# Patient Record
Sex: MALE | Race: BLACK OR AFRICAN AMERICAN | NOT HISPANIC OR LATINO | URBAN - METROPOLITAN AREA
[De-identification: names, ages, dates, MRNs, and addresses within clinical notes are randomized per-mention and may not be internally consistent; named-entity substitution may affect disease eponyms.]

---

## 2022-08-08 ENCOUNTER — INPATIENT (INPATIENT)
Facility: HOSPITAL | Age: 67
LOS: 6 days | Discharge: HOME | DRG: 871 | End: 2022-08-15
Attending: INTERNAL MEDICINE | Admitting: INTERNAL MEDICINE
Payer: SELF-PAY

## 2022-08-08 VITALS
WEIGHT: 179.9 LBS | HEIGHT: 71 IN | HEART RATE: 116 BPM | DIASTOLIC BLOOD PRESSURE: 81 MMHG | SYSTOLIC BLOOD PRESSURE: 135 MMHG | TEMPERATURE: 95 F | OXYGEN SATURATION: 99 % | RESPIRATION RATE: 16 BRPM

## 2022-08-08 DIAGNOSIS — N39.0 URINARY TRACT INFECTION, SITE NOT SPECIFIED: ICD-10-CM

## 2022-08-08 LAB
ALBUMIN SERPL ELPH-MCNC: 2.9 G/DL — LOW (ref 3.3–5)
ALP SERPL-CCNC: 116 U/L — SIGNIFICANT CHANGE UP (ref 40–120)
ALT FLD-CCNC: 13 U/L — SIGNIFICANT CHANGE UP (ref 10–45)
ANION GAP SERPL CALC-SCNC: 13 MMOL/L — SIGNIFICANT CHANGE UP (ref 5–17)
APPEARANCE UR: ABNORMAL
APTT BLD: 18.3 SEC — LOW (ref 27.5–35.5)
AST SERPL-CCNC: 13 U/L — SIGNIFICANT CHANGE UP (ref 10–40)
B-OH-BUTYR SERPL-SCNC: 0.3 MMOL/L — SIGNIFICANT CHANGE UP
BACTERIA # UR AUTO: ABNORMAL
BASE EXCESS BLDV CALC-SCNC: -0.9 MMOL/L — SIGNIFICANT CHANGE UP (ref -2–2)
BASOPHILS # BLD AUTO: 0 K/UL — SIGNIFICANT CHANGE UP (ref 0–0.2)
BASOPHILS NFR BLD AUTO: 0 % — SIGNIFICANT CHANGE UP (ref 0–2)
BILIRUB SERPL-MCNC: 0.9 MG/DL — SIGNIFICANT CHANGE UP (ref 0.2–1.2)
BILIRUB UR-MCNC: NEGATIVE — SIGNIFICANT CHANGE UP
BUN SERPL-MCNC: 43 MG/DL — HIGH (ref 7–23)
CA-I SERPL-SCNC: 1.19 MMOL/L — SIGNIFICANT CHANGE UP (ref 1.15–1.33)
CALCIUM SERPL-MCNC: 8.8 MG/DL — SIGNIFICANT CHANGE UP (ref 8.4–10.5)
CHLORIDE BLDV-SCNC: 94 MMOL/L — LOW (ref 96–108)
CHLORIDE SERPL-SCNC: 91 MMOL/L — LOW (ref 96–108)
CO2 BLDV-SCNC: 25 MMOL/L — SIGNIFICANT CHANGE UP (ref 22–26)
CO2 SERPL-SCNC: 22 MMOL/L — SIGNIFICANT CHANGE UP (ref 22–31)
COLOR SPEC: YELLOW — SIGNIFICANT CHANGE UP
CREAT SERPL-MCNC: 1.58 MG/DL — HIGH (ref 0.5–1.3)
DACRYOCYTES BLD QL SMEAR: SLIGHT — SIGNIFICANT CHANGE UP
DIFF PNL FLD: ABNORMAL
EGFR: 48 ML/MIN/1.73M2 — LOW
ELLIPTOCYTES BLD QL SMEAR: SLIGHT — SIGNIFICANT CHANGE UP
EOSINOPHIL # BLD AUTO: 0 K/UL — SIGNIFICANT CHANGE UP (ref 0–0.5)
EOSINOPHIL NFR BLD AUTO: 0 % — SIGNIFICANT CHANGE UP (ref 0–6)
EPI CELLS # UR: 0 /HPF — SIGNIFICANT CHANGE UP
FLUAV AG NPH QL: SIGNIFICANT CHANGE UP
FLUBV AG NPH QL: SIGNIFICANT CHANGE UP
GAS PNL BLDV: 124 MMOL/L — LOW (ref 136–145)
GAS PNL BLDV: SIGNIFICANT CHANGE UP
GAS PNL BLDV: SIGNIFICANT CHANGE UP
GLUCOSE BLDC GLUCOMTR-MCNC: 295 MG/DL — HIGH (ref 70–99)
GLUCOSE BLDV-MCNC: 368 MG/DL — HIGH (ref 70–99)
GLUCOSE SERPL-MCNC: 358 MG/DL — HIGH (ref 70–99)
GLUCOSE UR QL: ABNORMAL
HCO3 BLDV-SCNC: 24 MMOL/L — SIGNIFICANT CHANGE UP (ref 22–29)
HCT VFR BLD CALC: 31.7 % — LOW (ref 39–50)
HCT VFR BLDA CALC: 31 % — LOW (ref 39–51)
HGB BLD CALC-MCNC: 10.4 G/DL — LOW (ref 12.6–17.4)
HGB BLD-MCNC: 10.4 G/DL — LOW (ref 13–17)
INR BLD: 1.36 RATIO — HIGH (ref 0.88–1.16)
KETONES UR-MCNC: NEGATIVE — SIGNIFICANT CHANGE UP
LACTATE BLDV-MCNC: 1.6 MMOL/L — SIGNIFICANT CHANGE UP (ref 0.7–2)
LEUKOCYTE ESTERASE UR-ACNC: ABNORMAL
LYMPHOCYTES # BLD AUTO: 1.78 K/UL — SIGNIFICANT CHANGE UP (ref 1–3.3)
LYMPHOCYTES # BLD AUTO: 14.2 % — SIGNIFICANT CHANGE UP (ref 13–44)
MAGNESIUM SERPL-MCNC: 2.1 MG/DL — SIGNIFICANT CHANGE UP (ref 1.6–2.6)
MANUAL SMEAR VERIFICATION: SIGNIFICANT CHANGE UP
MCHC RBC-ENTMCNC: 22.6 PG — LOW (ref 27–34)
MCHC RBC-ENTMCNC: 32.8 GM/DL — SIGNIFICANT CHANGE UP (ref 32–36)
MCV RBC AUTO: 68.8 FL — LOW (ref 80–100)
MONOCYTES # BLD AUTO: 1.21 K/UL — HIGH (ref 0–0.9)
MONOCYTES NFR BLD AUTO: 9.7 % — SIGNIFICANT CHANGE UP (ref 2–14)
NEUTROPHILS # BLD AUTO: 9.51 K/UL — HIGH (ref 1.8–7.4)
NEUTROPHILS NFR BLD AUTO: 76.1 % — SIGNIFICANT CHANGE UP (ref 43–77)
NITRITE UR-MCNC: POSITIVE
PCO2 BLDV: 37 MMHG — LOW (ref 42–55)
PH BLDV: 7.41 — SIGNIFICANT CHANGE UP (ref 7.32–7.43)
PH UR: 6 — SIGNIFICANT CHANGE UP (ref 5–8)
PLAT MORPH BLD: NORMAL — SIGNIFICANT CHANGE UP
PLATELET # BLD AUTO: 152 K/UL — SIGNIFICANT CHANGE UP (ref 150–400)
PO2 BLDV: 62 MMHG — HIGH (ref 25–45)
POIKILOCYTOSIS BLD QL AUTO: SLIGHT — SIGNIFICANT CHANGE UP
POTASSIUM BLDV-SCNC: 3.8 MMOL/L — SIGNIFICANT CHANGE UP (ref 3.5–5.1)
POTASSIUM SERPL-MCNC: 3.8 MMOL/L — SIGNIFICANT CHANGE UP (ref 3.5–5.3)
POTASSIUM SERPL-SCNC: 3.8 MMOL/L — SIGNIFICANT CHANGE UP (ref 3.5–5.3)
PROT SERPL-MCNC: 7 G/DL — SIGNIFICANT CHANGE UP (ref 6–8.3)
PROT UR-MCNC: ABNORMAL
PROTHROM AB SERPL-ACNC: 15.7 SEC — HIGH (ref 10.5–13.4)
RBC # BLD: 4.61 M/UL — SIGNIFICANT CHANGE UP (ref 4.2–5.8)
RBC # FLD: 14.8 % — HIGH (ref 10.3–14.5)
RBC BLD AUTO: ABNORMAL
RBC CASTS # UR COMP ASSIST: 37 /HPF — HIGH (ref 0–4)
RSV RNA NPH QL NAA+NON-PROBE: SIGNIFICANT CHANGE UP
SAO2 % BLDV: 91.9 % — HIGH (ref 67–88)
SARS-COV-2 RNA SPEC QL NAA+PROBE: SIGNIFICANT CHANGE UP
SCHISTOCYTES BLD QL AUTO: SLIGHT — SIGNIFICANT CHANGE UP
SODIUM SERPL-SCNC: 126 MMOL/L — LOW (ref 135–145)
SP GR SPEC: 1.02 — SIGNIFICANT CHANGE UP (ref 1.01–1.02)
TOXIC GRANULES BLD QL SMEAR: PRESENT — SIGNIFICANT CHANGE UP
UROBILINOGEN FLD QL: ABNORMAL
WBC # BLD: 12.5 K/UL — HIGH (ref 3.8–10.5)
WBC # FLD AUTO: 12.5 K/UL — HIGH (ref 3.8–10.5)
WBC UR QL: 210 /HPF — HIGH (ref 0–5)

## 2022-08-08 PROCEDURE — 71045 X-RAY EXAM CHEST 1 VIEW: CPT | Mod: 26

## 2022-08-08 PROCEDURE — 99285 EMERGENCY DEPT VISIT HI MDM: CPT

## 2022-08-08 PROCEDURE — 74176 CT ABD & PELVIS W/O CONTRAST: CPT | Mod: 26

## 2022-08-08 PROCEDURE — 93010 ELECTROCARDIOGRAM REPORT: CPT

## 2022-08-08 RX ORDER — DEXTROSE 50 % IN WATER 50 %
15 SYRINGE (ML) INTRAVENOUS ONCE
Refills: 0 | Status: DISCONTINUED | OUTPATIENT
Start: 2022-08-08 | End: 2022-08-15

## 2022-08-08 RX ORDER — CEFTRIAXONE 500 MG/1
1000 INJECTION, POWDER, FOR SOLUTION INTRAMUSCULAR; INTRAVENOUS ONCE
Refills: 0 | Status: COMPLETED | OUTPATIENT
Start: 2022-08-08 | End: 2022-08-08

## 2022-08-08 RX ORDER — SODIUM CHLORIDE 9 MG/ML
1000 INJECTION, SOLUTION INTRAVENOUS
Refills: 0 | Status: COMPLETED | OUTPATIENT
Start: 2022-08-08 | End: 2022-08-08

## 2022-08-08 RX ORDER — ACETAMINOPHEN 500 MG
975 TABLET ORAL ONCE
Refills: 0 | Status: COMPLETED | OUTPATIENT
Start: 2022-08-08 | End: 2022-08-08

## 2022-08-08 RX ORDER — SODIUM CHLORIDE 9 MG/ML
1000 INJECTION, SOLUTION INTRAVENOUS
Refills: 0 | Status: DISCONTINUED | OUTPATIENT
Start: 2022-08-08 | End: 2022-08-15

## 2022-08-08 RX ORDER — INSULIN LISPRO 100/ML
VIAL (ML) SUBCUTANEOUS AT BEDTIME
Refills: 0 | Status: DISCONTINUED | OUTPATIENT
Start: 2022-08-08 | End: 2022-08-15

## 2022-08-08 RX ORDER — INSULIN GLARGINE 100 [IU]/ML
5 INJECTION, SOLUTION SUBCUTANEOUS AT BEDTIME
Refills: 0 | Status: DISCONTINUED | OUTPATIENT
Start: 2022-08-08 | End: 2022-08-09

## 2022-08-08 RX ORDER — GLUCAGON INJECTION, SOLUTION 0.5 MG/.1ML
1 INJECTION, SOLUTION SUBCUTANEOUS ONCE
Refills: 0 | Status: DISCONTINUED | OUTPATIENT
Start: 2022-08-08 | End: 2022-08-15

## 2022-08-08 RX ORDER — HEPARIN SODIUM 5000 [USP'U]/ML
5000 INJECTION INTRAVENOUS; SUBCUTANEOUS EVERY 8 HOURS
Refills: 0 | Status: DISCONTINUED | OUTPATIENT
Start: 2022-08-08 | End: 2022-08-15

## 2022-08-08 RX ORDER — SODIUM CHLORIDE 9 MG/ML
1000 INJECTION, SOLUTION INTRAVENOUS ONCE
Refills: 0 | Status: COMPLETED | OUTPATIENT
Start: 2022-08-08 | End: 2022-08-08

## 2022-08-08 RX ORDER — INSULIN LISPRO 100/ML
VIAL (ML) SUBCUTANEOUS
Refills: 0 | Status: DISCONTINUED | OUTPATIENT
Start: 2022-08-08 | End: 2022-08-15

## 2022-08-08 RX ORDER — PIPERACILLIN AND TAZOBACTAM 4; .5 G/20ML; G/20ML
3.38 INJECTION, POWDER, LYOPHILIZED, FOR SOLUTION INTRAVENOUS EVERY 8 HOURS
Refills: 0 | Status: DISCONTINUED | OUTPATIENT
Start: 2022-08-09 | End: 2022-08-09

## 2022-08-08 RX ORDER — PIPERACILLIN AND TAZOBACTAM 4; .5 G/20ML; G/20ML
3.38 INJECTION, POWDER, LYOPHILIZED, FOR SOLUTION INTRAVENOUS ONCE
Refills: 0 | Status: COMPLETED | OUTPATIENT
Start: 2022-08-08 | End: 2022-08-08

## 2022-08-08 RX ORDER — DEXTROSE 50 % IN WATER 50 %
25 SYRINGE (ML) INTRAVENOUS ONCE
Refills: 0 | Status: DISCONTINUED | OUTPATIENT
Start: 2022-08-08 | End: 2022-08-15

## 2022-08-08 RX ORDER — DEXTROSE 50 % IN WATER 50 %
12.5 SYRINGE (ML) INTRAVENOUS ONCE
Refills: 0 | Status: DISCONTINUED | OUTPATIENT
Start: 2022-08-08 | End: 2022-08-15

## 2022-08-08 RX ORDER — ONDANSETRON 8 MG/1
4 TABLET, FILM COATED ORAL ONCE
Refills: 0 | Status: COMPLETED | OUTPATIENT
Start: 2022-08-08 | End: 2022-08-08

## 2022-08-08 RX ADMIN — SODIUM CHLORIDE 1000 MILLILITER(S): 9 INJECTION, SOLUTION INTRAVENOUS at 15:47

## 2022-08-08 RX ADMIN — Medication 975 MILLIGRAM(S): at 17:06

## 2022-08-08 RX ADMIN — Medication 975 MILLIGRAM(S): at 16:15

## 2022-08-08 RX ADMIN — CEFTRIAXONE 1000 MILLIGRAM(S): 500 INJECTION, POWDER, FOR SOLUTION INTRAMUSCULAR; INTRAVENOUS at 18:11

## 2022-08-08 RX ADMIN — ONDANSETRON 4 MILLIGRAM(S): 8 TABLET, FILM COATED ORAL at 18:11

## 2022-08-08 RX ADMIN — INSULIN GLARGINE 5 UNIT(S): 100 INJECTION, SOLUTION SUBCUTANEOUS at 22:34

## 2022-08-08 RX ADMIN — Medication 1: at 22:46

## 2022-08-08 RX ADMIN — SODIUM CHLORIDE 1000 MILLILITER(S): 9 INJECTION, SOLUTION INTRAVENOUS at 16:15

## 2022-08-08 RX ADMIN — HEPARIN SODIUM 5000 UNIT(S): 5000 INJECTION INTRAVENOUS; SUBCUTANEOUS at 22:19

## 2022-08-08 RX ADMIN — SODIUM CHLORIDE 75 MILLILITER(S): 9 INJECTION, SOLUTION INTRAVENOUS at 22:47

## 2022-08-08 RX ADMIN — CEFTRIAXONE 100 MILLIGRAM(S): 500 INJECTION, POWDER, FOR SOLUTION INTRAMUSCULAR; INTRAVENOUS at 17:11

## 2022-08-08 RX ADMIN — PIPERACILLIN AND TAZOBACTAM 200 GRAM(S): 4; .5 INJECTION, POWDER, LYOPHILIZED, FOR SOLUTION INTRAVENOUS at 22:19

## 2022-08-08 NOTE — H&P ADULT - ASSESSMENT
76F PMH HTN, DM, hypothyroidism presenting with 3 days of worsening SOB and dry cough associated with sternal nonradiating, nonpleuritic chest pressure. Denies n/v/d/c, abdominal pain. Reports fever 100F on Saturday. Reports chills. Denies headache, lightheadedness. Denies calf pain/swelling, hx of blood clots, recent travel/immobilization/surgery/malignancy, hormone use.     68 yo M with no known pmhx, does not regualrly see doctors, presents to the ED for generalized weakness/fatigue, decreased PO intake, polydipsia.    # Sepsis   # UTI  Hypothermic/ Hyperthermic   Leukocytosis   Positive UA   Blood cx Urine cx   COVID neg   Zosyn empiric coverage   ID consult in AM       # Hyperglycemia A1C   Start Lantus HISS     # PAO dehydration   iv Fluids     # HTN Start patient on low dose Norvasc

## 2022-08-08 NOTE — ED PROVIDER NOTE - CARE PLAN
1 Principal Discharge DX:	Acute UTI   Principal Discharge DX:	Sepsis secondary to UTI  Secondary Diagnosis:	Hyperglycemia

## 2022-08-08 NOTE — ED PROVIDER NOTE - CLINICAL SUMMARY MEDICAL DECISION MAKING FREE TEXT BOX
67M no PMH, does not see doctors presenting with a few days of fatigue, generalized weakness, hyperglycemia. FSG by EMS 400s, rpt here in 300s. Found to by hypothermic 94F in triage. Given hx and physical, ddx includes but is not limited to sepsis secondary to uti vs pneumonia, DKA, hyperthyroid, electrolyte abn. Plan for labs, IVF, ecg, cxr, UA, likely TBA 67M no PMH, does not see doctors presenting with a few days of fatigue, generalized weakness, hyperglycemia. FSG by EMS 400s, rpt here in 300s. Found to by hypothermic 94F in triage. Given hx and physical, ddx includes but is not limited to sepsis secondary to uti vs pneumonia, DKA, hyperthyroid, electrolyte abn. non tender abd, doubt acute abd etiology for pt's sx. no cva tenderesns. Plan for labs, IVF, ecg, cxr, UA, likely TBA

## 2022-08-08 NOTE — ED ADULT NURSE NOTE - IS THE PATIENT ABLE TO BE SCREENED?
[FreeTextEntry1] : Reduce Symbicort HFA to 1 puff BID\par Return for pulmonary follow-up in 6 weeks. Yes

## 2022-08-08 NOTE — H&P ADULT - HISTORY OF PRESENT ILLNESS
66 yo M with no known pmhx, does not regualrly see doctors, presents to the ED for generalized weakness/fatigue, decreased PO intake, polydipsia.     Patient poor historian does not follow up with doctors, reports increased urination, wekaness, decreased po intake.  Patient endorses of having subjective fevers on and off x 3 days.    No nausea/ vomiting.   Pt FS in the field was 400s, given some IVF (currently running upon ED arrival), with ED FS in 300s here.   Pt found to be hypothermic to 9

## 2022-08-08 NOTE — ED PROVIDER NOTE - PROGRESS NOTE DETAILS
Estrellita Castillo DO (PGY2): Pt with positive UTI. Ceftriaxone ordered. No signs of DKA on labs. Plan TBA for sepsis secondary to UTI and new onset diabetes. Spoke with Dr. Craig, will admit to her service. Estrellita Castillo DO (PGY2): Rectal exam: chaperoned by Brett ED tech. No hemmorrhoids or fissures noted on external exam. No masses palpated. Brown stool present. Prostate nontender.

## 2022-08-08 NOTE — ED ADULT NURSE NOTE - OBJECTIVE STATEMENT
67 yr old male who has not gone to a doctor in a long time was found wandering around his work parking lot saying he does not feel well. he was found to have high sugar of 400. on assessment a and o x 3 lungs clear abd soft non tender no swelling in extremities. some nausea and vomiting on arrival. pt had also urinated on himself said he couldn't make it to toilet. complains of abd pain

## 2022-08-08 NOTE — ED PROVIDER NOTE - OBJECTIVE STATEMENT
67M no PMH, does not see doctors presenting with 67M no PMH, does not see doctors presenting with a few days of fatigue, generalized weakness, hyperglycemia. FSG by EMS 400s, rpt here in 300s. Found to by hypothermic 94F in triage. Denies chest pain, sob, cough. Denies dysuria, hematuria, abdominal pain, n/v/d/c, headache. 68 yo M with no known pmhx, does not regualrly see doctors, presents to the ED for generalized weakness/fatigue, decreased PO intake, polydipsia. Pt FS in the field was 400s, given some IVF (currently running upon ED arrival), with ED FS in 300s here. Pt found to be hypothermic to 94F. He denies reported fevres, dysuria, abd pain, n/v/d, cp, sob,. Pt does have mild cough.

## 2022-08-08 NOTE — PATIENT PROFILE ADULT - FALL HARM RISK - HARM RISK INTERVENTIONS

## 2022-08-08 NOTE — ED PROVIDER NOTE - ATTENDING CONTRIBUTION TO CARE
I, Vijay Alvarado, performed a history and physical exam of the patient and discussed their management with the resident and/or advanced care provider. I reviewed the resident and/or advanced care provider's note and agree with the documented findings and plan of care except where noted. I was present and available for all procedures.     agree with above.

## 2022-08-08 NOTE — ED PROVIDER NOTE - PHYSICAL EXAMINATION
GENERAL: Awake. Alert. NAD. Well nourished.  HEENT: NC/AT, Conjunctiva pink, no scleral icterus. Airway patent. Moist mucous membranes.  LUNGS: CTAB. No wheezes or rales noted.  CARDIAC: Chest non-tender to palpation. RRR.  ABDOMEN: No masses noted. Soft, NT, ND, no rebound, no guarding.  EXT: No edema, no calf tenderness, distal pulses 2+ bilaterally  NEURO: A&Ox3. Moving all extremities. Sensation and strength intact throughout. No focal deficits.   SKIN: Warm and dry.   PSYCH: Normal affect. PHYSICAL EXAM:  GENERAL: non-toxic appearing; in no respiratory distress;  HEAD Atraumatic, Normocephalic  ENT; dry mucous membranes;   NECK: No JVD; trachea midline  EYES: PERRL, EOMs intact b/l w/out deficits; normal conjunctiva  CHEST/LUNG: CTAB no wheezes/rhonchi/rales  HEART: RRR no murmur/gallops/rubs  ABDOMEN: soft, NT, ND  EXTREMITIES: No LE edema, +2 radial pulses b/l, +2 DP/PT pulses b/l  MUSCULOSKELETAL: FROM of all 4 extremities;  NERVOUS SYSTEM:  A&Ox3, No motor deficits or sensory deficits; CNII-XII intact; Speech is fluent and appropriate  SKIN:  Warm and dry as visualized

## 2022-08-09 DIAGNOSIS — E78.5 HYPERLIPIDEMIA, UNSPECIFIED: ICD-10-CM

## 2022-08-09 DIAGNOSIS — E11.65 TYPE 2 DIABETES MELLITUS WITH HYPERGLYCEMIA: ICD-10-CM

## 2022-08-09 DIAGNOSIS — I10 ESSENTIAL (PRIMARY) HYPERTENSION: ICD-10-CM

## 2022-08-09 LAB
-  COAGULASE NEGATIVE STAPHYLOCOCCUS: SIGNIFICANT CHANGE UP
A1C WITH ESTIMATED AVERAGE GLUCOSE RESULT: 11.4 % — HIGH (ref 4–5.6)
ANION GAP SERPL CALC-SCNC: 10 MMOL/L — SIGNIFICANT CHANGE UP (ref 5–17)
BUN SERPL-MCNC: 34 MG/DL — HIGH (ref 7–23)
CALCIUM SERPL-MCNC: 8.8 MG/DL — SIGNIFICANT CHANGE UP (ref 8.4–10.5)
CHLORIDE SERPL-SCNC: 93 MMOL/L — LOW (ref 96–108)
CO2 SERPL-SCNC: 26 MMOL/L — SIGNIFICANT CHANGE UP (ref 22–31)
CREAT SERPL-MCNC: 1.48 MG/DL — HIGH (ref 0.5–1.3)
E COLI DNA BLD POS QL NAA+NON-PROBE: SIGNIFICANT CHANGE UP
EGFR: 52 ML/MIN/1.73M2 — LOW
ESTIMATED AVERAGE GLUCOSE: 280 MG/DL — HIGH (ref 68–114)
GLUCOSE BLDC GLUCOMTR-MCNC: 138 MG/DL — HIGH (ref 70–99)
GLUCOSE BLDC GLUCOMTR-MCNC: 212 MG/DL — HIGH (ref 70–99)
GLUCOSE BLDC GLUCOMTR-MCNC: 265 MG/DL — HIGH (ref 70–99)
GLUCOSE BLDC GLUCOMTR-MCNC: 277 MG/DL — HIGH (ref 70–99)
GLUCOSE BLDC GLUCOMTR-MCNC: 283 MG/DL — HIGH (ref 70–99)
GLUCOSE SERPL-MCNC: 287 MG/DL — HIGH (ref 70–99)
GRAM STN FLD: SIGNIFICANT CHANGE UP
HCT VFR BLD CALC: 30.7 % — LOW (ref 39–50)
HCV AB S/CO SERPL IA: 0.19 S/CO — SIGNIFICANT CHANGE UP (ref 0–0.99)
HCV AB SERPL-IMP: SIGNIFICANT CHANGE UP
HGB BLD-MCNC: 10.1 G/DL — LOW (ref 13–17)
MCHC RBC-ENTMCNC: 22.7 PG — LOW (ref 27–34)
MCHC RBC-ENTMCNC: 32.9 GM/DL — SIGNIFICANT CHANGE UP (ref 32–36)
MCV RBC AUTO: 69.1 FL — LOW (ref 80–100)
METHOD TYPE: SIGNIFICANT CHANGE UP
NRBC # BLD: 0 /100 WBCS — SIGNIFICANT CHANGE UP (ref 0–0)
OSMOLALITY SERPL: 287 MOSMOL/KG — SIGNIFICANT CHANGE UP (ref 280–301)
PLATELET # BLD AUTO: 164 K/UL — SIGNIFICANT CHANGE UP (ref 150–400)
POTASSIUM SERPL-MCNC: 4 MMOL/L — SIGNIFICANT CHANGE UP (ref 3.5–5.3)
POTASSIUM SERPL-SCNC: 4 MMOL/L — SIGNIFICANT CHANGE UP (ref 3.5–5.3)
RBC # BLD: 4.44 M/UL — SIGNIFICANT CHANGE UP (ref 4.2–5.8)
RBC # FLD: 14.6 % — HIGH (ref 10.3–14.5)
SODIUM SERPL-SCNC: 129 MMOL/L — LOW (ref 135–145)
SPECIMEN SOURCE: SIGNIFICANT CHANGE UP
SPECIMEN SOURCE: SIGNIFICANT CHANGE UP
WBC # BLD: 9.33 K/UL — SIGNIFICANT CHANGE UP (ref 3.8–10.5)
WBC # FLD AUTO: 9.33 K/UL — SIGNIFICANT CHANGE UP (ref 3.8–10.5)

## 2022-08-09 PROCEDURE — 99254 IP/OBS CNSLTJ NEW/EST MOD 60: CPT

## 2022-08-09 PROCEDURE — 99255 IP/OBS CONSLTJ NEW/EST HI 80: CPT

## 2022-08-09 RX ORDER — ACETAMINOPHEN 500 MG
1000 TABLET ORAL ONCE
Refills: 0 | Status: COMPLETED | OUTPATIENT
Start: 2022-08-09 | End: 2022-08-09

## 2022-08-09 RX ORDER — ACETAMINOPHEN 500 MG
975 TABLET ORAL ONCE
Refills: 0 | Status: COMPLETED | OUTPATIENT
Start: 2022-08-09 | End: 2022-08-09

## 2022-08-09 RX ORDER — INSULIN LISPRO 100/ML
5 VIAL (ML) SUBCUTANEOUS
Refills: 0 | Status: DISCONTINUED | OUTPATIENT
Start: 2022-08-09 | End: 2022-08-14

## 2022-08-09 RX ORDER — INSULIN GLARGINE 100 [IU]/ML
15 INJECTION, SOLUTION SUBCUTANEOUS AT BEDTIME
Refills: 0 | Status: DISCONTINUED | OUTPATIENT
Start: 2022-08-09 | End: 2022-08-14

## 2022-08-09 RX ORDER — CHLORHEXIDINE GLUCONATE 213 G/1000ML
1 SOLUTION TOPICAL DAILY
Refills: 0 | Status: DISCONTINUED | OUTPATIENT
Start: 2022-08-09 | End: 2022-08-15

## 2022-08-09 RX ORDER — CEFTRIAXONE 500 MG/1
1000 INJECTION, POWDER, FOR SOLUTION INTRAMUSCULAR; INTRAVENOUS EVERY 24 HOURS
Refills: 0 | Status: DISCONTINUED | OUTPATIENT
Start: 2022-08-09 | End: 2022-08-15

## 2022-08-09 RX ADMIN — INSULIN GLARGINE 15 UNIT(S): 100 INJECTION, SOLUTION SUBCUTANEOUS at 21:57

## 2022-08-09 RX ADMIN — PIPERACILLIN AND TAZOBACTAM 25 GRAM(S): 4; .5 INJECTION, POWDER, LYOPHILIZED, FOR SOLUTION INTRAVENOUS at 09:08

## 2022-08-09 RX ADMIN — PIPERACILLIN AND TAZOBACTAM 25 GRAM(S): 4; .5 INJECTION, POWDER, LYOPHILIZED, FOR SOLUTION INTRAVENOUS at 02:56

## 2022-08-09 RX ADMIN — Medication 975 MILLIGRAM(S): at 08:31

## 2022-08-09 RX ADMIN — Medication 975 MILLIGRAM(S): at 08:58

## 2022-08-09 RX ADMIN — HEPARIN SODIUM 5000 UNIT(S): 5000 INJECTION INTRAVENOUS; SUBCUTANEOUS at 21:24

## 2022-08-09 RX ADMIN — Medication 1000 MILLIGRAM(S): at 19:04

## 2022-08-09 RX ADMIN — Medication 400 MILLIGRAM(S): at 18:18

## 2022-08-09 RX ADMIN — Medication 3: at 12:14

## 2022-08-09 RX ADMIN — Medication 400 MILLIGRAM(S): at 00:37

## 2022-08-09 RX ADMIN — HEPARIN SODIUM 5000 UNIT(S): 5000 INJECTION INTRAVENOUS; SUBCUTANEOUS at 06:03

## 2022-08-09 RX ADMIN — Medication 5 UNIT(S): at 17:45

## 2022-08-09 RX ADMIN — Medication 1000 MILLIGRAM(S): at 01:37

## 2022-08-09 RX ADMIN — Medication 2: at 17:45

## 2022-08-09 RX ADMIN — HEPARIN SODIUM 5000 UNIT(S): 5000 INJECTION INTRAVENOUS; SUBCUTANEOUS at 17:45

## 2022-08-09 RX ADMIN — Medication 3: at 09:08

## 2022-08-09 RX ADMIN — CHLORHEXIDINE GLUCONATE 1 APPLICATION(S): 213 SOLUTION TOPICAL at 11:41

## 2022-08-09 RX ADMIN — CEFTRIAXONE 100 MILLIGRAM(S): 500 INJECTION, POWDER, FOR SOLUTION INTRAMUSCULAR; INTRAVENOUS at 17:46

## 2022-08-09 NOTE — DIETITIAN INITIAL EVALUATION ADULT - SIGNS/SYMPTOMS
Hyperglycemia, FS >250mg/dL, new DM dx, pt receiving insulin  hyperglycemia, weight loss 10% in 1 year

## 2022-08-09 NOTE — DIETITIAN INITIAL EVALUATION ADULT - REASON INDICATOR FOR ASSESSMENT
New DM and Poor PO Intake. "History of Present Illness:   66 yo M with no known pmhx, does not regualrly see doctors, presents to the ED for generalized weakness/fatigue, decreased PO intake, polydipsia. "     New DM and Poor PO Intake. "History of Present Illness:   66 yo M with no known pmhx, does not regularly see doctors, presents to the ED for generalized weakness/fatigue, decreased PO intake, polydipsia. "

## 2022-08-09 NOTE — DIETITIAN INITIAL EVALUATION ADULT - OTHER INFO
Pt denies chewing difficulty, he does not take any vitamins at home.  patient appears  physically fit, he has high actitivity level in his job as stated

## 2022-08-09 NOTE — PROGRESS NOTE ADULT - ASSESSMENT
66 yo M with no known pmhx, does not regualrly see doctors, presents to the ED for generalized weakness/fatigue, decreased PO intake, polydipsia.    # Sepsis   # UTI  Hypothermic/ Hyperthermic   Leukocytosis   Positive UA   Blood cx Urine cx   COVID neg   Zosyn empiric coverage   ID consult appreciated         # Hyperglycemia A1C   Start Lantus HISS     # PAO dehydration   iv Fluids     # HTN Start patient on low dose Norvasc

## 2022-08-09 NOTE — CONSULT NOTE ADULT - ATTENDING COMMENTS
Patient with new diagnosis of DM with E coli bacteremia secondary to Pyelonephritis  BCID PCR without detected ESBL targets  Would deescalate Zosyn to Ceftriaxone  Coagulase negative staph on BCx likely contaminant  Would repeat blood cultures    I will continue to follow. Please feel free to contact me with any further questions.    Chicho Madrid M.D.  [facility] Division of Infectious Disease  8AM-5PM Monday - Friday: Available on [employer] Teams  After Hours and Holidays (or if no response on [employer] Teams): Please contact the Infectious Diseases Office at [phone]

## 2022-08-09 NOTE — CONSULT NOTE ADULT - SUBJECTIVE AND OBJECTIVE BOX
Patient is a 67y old  Male who presents with a chief complaint of Urinary tract infection     (09 Aug 2022 10:51)    HPI:  68 yo M with no known pmhx, does not regualrly see doctors, presents to the ED for generalized weakness/fatigue, decreased PO intake, polydipsia.     Patient poor historian does not follow up with doctors, reports increased urination, wekaness, decreased po intake.  Patient endorses of having subjective fevers on and off x 3 days.    No nausea/ vomiting.   Pt FS in the field was 400s, given some IVF (currently running upon ED arrival), with ED FS in 300s here.   Pt found to be hypothermic to 9 (08 Aug 2022 19:04)       prior hospital charts reviewed [  ]  primary team notes reviewed [  ]  other consultant notes reviewed [  ]    PAST MEDICAL & SURGICAL HISTORY:  No pertinent past medical history          Allergies  No Known Allergies    ANTIMICROBIALS (past 90 days)  MEDICATIONS  (STANDING):  cefTRIAXone   IVPB   100 mL/Hr IV Intermittent (22 @ 17:11)    piperacillin/tazobactam IVPB.   200 mL/Hr IV Intermittent (22 @ 22:19)    piperacillin/tazobactam IVPB..   25 mL/Hr IV Intermittent (22 @ 09:08)   25 mL/Hr IV Intermittent (22 @ 02:56)        piperacillin/tazobactam IVPB.. 3.375 every 8 hours    MEDICATIONS  (STANDING):  dextrose 50% Injectable 25 once  dextrose 50% Injectable 12.5 once  dextrose 50% Injectable 25 once  dextrose Oral Gel 15 once PRN  glucagon  Injectable 1 once  heparin   Injectable 5000 every 8 hours  insulin glargine Injectable (LANTUS) 5 at bedtime  insulin lispro (ADMELOG) corrective regimen sliding scale  three times a day before meals  insulin lispro (ADMELOG) corrective regimen sliding scale  at bedtime    SOCIAL HISTORY:       FAMILY HISTORY:    REVIEW OF SYSTEMS  [  ] ROS unobtainable because:    [  ] All other systems negative except as noted below:	    Constitutional:  [ ] fever [ ] chills  [ ] weight loss  [ ] weakness  Skin:  [ ] rash [ ] phlebitis	  Eyes: [ ] icterus [ ] pain  [ ] discharge	  ENMT: [ ] sore throat  [ ] thrush [ ] ulcers [ ] exudates  Respiratory: [ ] dyspnea [ ] hemoptysis [ ] cough [ ] sputum	  Cardiovascular:  [ ] chest pain [ ] palpitations [ ] edema	  Gastrointestinal:  [ ] nausea [ ] vomiting [ ] diarrhea [ ] constipation [ ] pain	  Genitourinary:  [ ] dysuria [ ] frequency [ ] hematuria [ ] discharge [ ] flank pain  [ ] incontinence  Musculoskeletal:  [ ] myalgias [ ] arthralgias [ ] arthritis  [ ] back pain  Neurological:  [ ] headache [ ] seizures  [ ] confusion/altered mental status  Psychiatric:  [ ] anxiety [ ] depression	  Hematology/Lymphatics:  [ ] lymphadenopathy  Endocrine:  [ ] adrenal [ ] thyroid  Allergic/Immunologic:	 [ ] transplant [ ] seasonal    Vital Signs Last 24 Hrs  T(F): 98.7 (22 @ 10:30), Max: 102.4 (22 @ 23:49)  Vital Signs Last 24 Hrs  HR: 89 (22 @ 10:30) (78 - 116)  BP: 136/68 (22 @ 10:30) (135/81 - 175/75)  RR: 18 (22 @ 10:30)  SpO2: 99% (22 @ 10:30) (97% - 99%)  Wt(kg): --    PHYSICAL EXAM:  Constitutional: non-toxic, no distress  HEAD/EYES: anicteric, no conjunctival injection  ENT:  supple, no thrush  Cardiovascular:   normal S1, S2, no murmur, no edema  Respiratory:  clear BS bilaterally, no wheezes, no rales  GI:  soft, non-tender, normal bowel sounds  :  no chaudhary, no CVA tenderness  Musculoskeletal:  no synovitis, normal ROM  Neurologic: awake and alert, normal strength, no focal findings  Skin:  no rash, no erythema, no phlebitis  Heme/Onc: no lymphadenopathy   Psychiatric:  awake, alert, appropriate mood                            10.4   12.50 )-----------( 152      ( 08 Aug 2022 15:20 )             31.7       129<L>  |  93<L>  |  34<H>  ----------------------------<  287<H>  4.0   |  26  |  1.48<H>    Ca    8.8      09 Aug 2022 06:20  Mg     2.1         TPro  7.0  /  Alb  2.9<L>  /  TBili  0.9  /  DBili  x   /  AST  13  /  ALT  13  /  AlkPhos  116      Urinalysis Basic - ( 08 Aug 2022 16:19 )    Color: Yellow / Appearance: Slightly Turbid / S.017 / pH: x  Gluc: x / Ketone: Negative  / Bili: Negative / Urobili: 2 mg/dL   Blood: x / Protein: 100 mg/dL / Nitrite: Positive   Leuk Esterase: Large / RBC: 37 /hpf /  /HPF   Sq Epi: x / Non Sq Epi: 0 /hpf / Bacteria: Many    MICROBIOLOGY:  Culture - Blood (collected 08 Aug 2022 14:45)  Source: .Blood Blood-Peripheral  Gram Stain (09 Aug 2022 06:34):    Growth in aerobic and anaerobic bottles: Gram Negative Rods  Preliminary Report (09 Aug 2022 06:35):    Growth in aerobic and anaerobic bottles: Gram Negative Rods    Culture - Blood (collected 08 Aug 2022 14:40)  Source: .Blood Blood-Peripheral  Gram Stain (09 Aug 2022 06:41):    Growth in aerobic bottle: Gram Negative Rods and Gram positive cocci in    pairs  Preliminary Report (09 Aug 2022 06:56):    Growth in aerobic bottle: Gram Negative Rods and Gram positive cocci in    pairs    ***Blood Panel PCR results on this specimen are available    approximately 3 hours after the Gram stain result.***    Gram stain, PCR, and/or culture results may not always    correspond due to difference in methodologies.    ************************************************************    This PCR assay was performed by multiplex PCR. This    Assay tests for 66 bacterial and resistance gene targets.    Please refer to the [facility] test directory    at [email or web address] for details.  Organism: Blood Culture PCR (09 Aug 2022 09:35)  Organism: Blood Culture PCR (09 Aug 2022 09:35)      -  Coagulase negative Staphylococcus: Detec      -  Escherichia coli: Detec      Method Type: PCR                RADIOLOGY:  < from: CT Abdomen and Pelvis No Cont (22 @ 19:12) >   No obstructing renal stones or hydronephrosis.  Mildly edematous left kidney with b/l non-specific fat stranding.  Mild symmetric bladder wall thickening.  Finding likely represent ascending urinary tract infection. Recommend   clinical correlation w/U/A.  Nonspecific left-sided predominant subcentimer in short-axis   retroperitoneal LNs  f/u official AM report.    < end of copied text >    < from: Xray Chest 1 View- PORTABLE-Urgent (22 @ 15:06) >  Clear lungs.    < end of copied text >     Patient is a 67y old  Male who presents with a chief complaint of Urinary tract infection     (09 Aug 2022 10:51)    HPI:  Mr. Smith is a 68 yo gentleman with no known PMH, does not regularly see doctors, presents to the ED for generalized weakness/fatigue, decreased PO intake, and polydipsia. Since the last 4 days, he has felt like he is urinating more frequently than normal but denies any dysuria. Also endorses intermittent subjective fevers in this time. Pt reports he does not see doctors because he is never feels sick. He reports being seen years ago at [facility] after he hit his head, but has not followed up since.     In the field, pt's FS was found to be 400s. On arrival to the ED was found to be hypothermic to 94.9F, otherwise vitally stable.     In the ED, pt was started on HISS, IVF, FS in 300s, positive UA, WBC 12. Started on empiric zosyn.              PAST MEDICAL & SURGICAL HISTORY:  No pertinent past medical history          Allergies  No Known Allergies    ANTIMICROBIALS (past 90 days)  MEDICATIONS  (STANDING):  cefTRIAXone   IVPB   100 mL/Hr IV Intermittent (22 @ 17:11)    piperacillin/tazobactam IVPB.   200 mL/Hr IV Intermittent (22 @ 22:19)    piperacillin/tazobactam IVPB..   25 mL/Hr IV Intermittent (22 @ 09:08)   25 mL/Hr IV Intermittent (22 @ 02:56)        piperacillin/tazobactam IVPB.. 3.375 every 8 hours    MEDICATIONS  (STANDING):  dextrose 50% Injectable 25 once  dextrose 50% Injectable 12.5 once  dextrose 50% Injectable 25 once  dextrose Oral Gel 15 once PRN  glucagon  Injectable 1 once  heparin   Injectable 5000 every 8 hours  insulin glargine Injectable (LANTUS) 5 at bedtime  insulin lispro (ADMELOG) corrective regimen sliding scale  three times a day before meals  insulin lispro (ADMELOG) corrective regimen sliding scale  at bedtime    SOCIAL HISTORY:     Lives in [place] in government housing. Originally from [place] [place], [place]. Works as , has absestos exposure. Former smoker, quit 6 months ago, formerly smoking .5 PPD x 10 years. No alcohol, no drugs. In monogamous sexual relationship with one female, no hx of STDs. No travel hx.     FAMILY HISTORY:  Unknown, pt poor historian    REVIEW OF SYSTEMS  [  ] ROS unobtainable because:    [  ] All other systems negative except as noted below:	    Constitutional:  [ ] fever [ ] chills  [ ] weight loss  [ ] weakness  Skin:  [ ] rash [ ] phlebitis	  Eyes: [ ] icterus [ ] pain  [ ] discharge	  ENMT: [ ] sore throat  [ ] thrush [ ] ulcers [ ] exudates  Respiratory: [ ] dyspnea [ ] hemoptysis [ ] cough [ ] sputum	  Cardiovascular:  [ ] chest pain [ ] palpitations [ ] edema	  Gastrointestinal:  [ ] nausea [ ] vomiting [ ] diarrhea [ ] constipation [ ] pain	  Genitourinary:  [ ] dysuria [ ] frequency [ ] hematuria [ ] discharge [ ] flank pain  [ ] incontinence  Musculoskeletal:  [ ] myalgias [ ] arthralgias [ ] arthritis  [ ] back pain  Neurological:  [ ] headache [ ] seizures  [ ] confusion/altered mental status  Psychiatric:  [ ] anxiety [ ] depression	  Hematology/Lymphatics:  [ ] lymphadenopathy  Endocrine:  [ ] adrenal [ ] thyroid  Allergic/Immunologic:	 [ ] transplant [ ] seasonal    Vital Signs Last 24 Hrs  T(F): 98.7 (22 @ 10:30), Max: 102.4 (22 @ 23:49)  Vital Signs Last 24 Hrs  HR: 89 (22 @ 10:30) (78 - 116)  BP: 136/68 (22 @ 10:30) (135/81 - 175/75)  RR: 18 (22 @ 10:30)  SpO2: 99% (22 @ 10:30) (97% - 99%)  Wt(kg): --    PHYSICAL EXAM:  Constitutional: non-toxic, no distress  HEAD/EYES: anicteric, no conjunctival injection  ENT:  supple, no thrush  Cardiovascular:   systolic murmur best auscultated at right upper sternal border with radiation to carotid artery, regular rate and rhythm  Respiratory:  clear BS bilaterally, no wheezes, no rales  GI:  soft, +TTP in epigastric and RLQ  :  no chaudhary, no CVA tenderness  Musculoskeletal:  no synovitis, normal ROM  Neurologic: awake and alert, normal strength, no focal findings  Skin:  no rash, no erythema, no phlebitis  Heme/Onc: no lymphadenopathy   Psychiatric:  awake, alert, appropriate mood                            10.4   12.50 )-----------( 152      ( 08 Aug 2022 15:20 )             31.7       129<L>  |  93<L>  |  34<H>  ----------------------------<  287<H>  4.0   |  26  |  1.48<H>    Ca    8.8      09 Aug 2022 06:20  Mg     2.1         TPro  7.0  /  Alb  2.9<L>  /  TBili  0.9  /  DBili  x   /  AST  13  /  ALT  13  /  AlkPhos  116      Urinalysis Basic - ( 08 Aug 2022 16:19 )    Color: Yellow / Appearance: Slightly Turbid / S.017 / pH: x  Gluc: x / Ketone: Negative  / Bili: Negative / Urobili: 2 mg/dL   Blood: x / Protein: 100 mg/dL / Nitrite: Positive   Leuk Esterase: Large / RBC: 37 /hpf /  /HPF   Sq Epi: x / Non Sq Epi: 0 /hpf / Bacteria: Many    MICROBIOLOGY:  Culture - Blood (collected 08 Aug 2022 14:45)  Source: .Blood Blood-Peripheral  Gram Stain (09 Aug 2022 06:34):    Growth in aerobic and anaerobic bottles: Gram Negative Rods  Preliminary Report (09 Aug 2022 06:35):    Growth in aerobic and anaerobic bottles: Gram Negative Rods    Culture - Blood (collected 08 Aug 2022 14:40)  Source: .Blood Blood-Peripheral  Gram Stain (09 Aug 2022 06:41):    Growth in aerobic bottle: Gram Negative Rods and Gram positive cocci in    pairs  Preliminary Report (09 Aug 2022 06:56):    Growth in aerobic bottle: Gram Negative Rods and Gram positive cocci in    pairs    ***Blood Panel PCR results on this specimen are available    approximately 3 hours after the Gram stain result.***    Gram stain, PCR, and/or culture results may not always    correspond due to difference in methodologies.    ************************************************************    This PCR assay was performed by multiplex PCR. This    Assay tests for 66 bacterial and resistance gene targets.    Please refer to the [facility] test directory    at [email or web address] for details.  Organism: Blood Culture PCR (09 Aug 2022 09:35)  Organism: Blood Culture PCR (09 Aug 2022 09:35)      -  Coagulase negative Staphylococcus: Detec      -  Escherichia coli: Detec      Method Type: PCR                RADIOLOGY:  < from: CT Abdomen and Pelvis No Cont (22 @ 19:12) >   No obstructing renal stones or hydronephrosis.  Mildly edematous left kidney with b/l non-specific fat stranding.  Mild symmetric bladder wall thickening.  Finding likely represent ascending urinary tract infection. Recommend   clinical correlation w/U/A.  Nonspecific left-sided predominant subcentimer in short-axis   retroperitoneal LNs  f/u official AM report.    < end of copied text >    < from: Xray Chest 1 View- PORTABLE-Urgent (22 @ 15:06) >  Clear lungs.    < end of copied text >     Patient is a 67y old  Male who presents with a chief complaint of Urinary tract infection     (09 Aug 2022 10:51)    HPI:  Mr. Smith is a 66 yo gentleman with no known PMH, does not regularly see doctors, presents to the ED for generalized weakness/fatigue, decreased PO intake, and polydipsia. Since the last 4 days, he has felt like he is urinating more frequently than normal but denies any dysuria. Also endorses intermittent subjective fevers/chills  at this time. Pt reports he does not see doctors because he is never feels sick. He reports being seen years ago at [facility] after he hit his head, but has not followed up since.     In the field, pt's FS was found to be 400s. On arrival to the ED was found to be hypothermic to 94.9F, otherwise vitally stable.     In the ED, pt was started on HISS, IVF, FS in 300s, positive UA, WBC 12. Started on empiric zosyn.     CT showing bladder wall thickening with edematous L kidney and b/l fat stranding likely c/w ascending UTI.    ID consulted for  Bcx GNR bacteremia.         PAST MEDICAL & SURGICAL HISTORY:  No pertinent past medical history          Allergies  No Known Allergies    ANTIMICROBIALS (past 90 days)  MEDICATIONS  (STANDING):  cefTRIAXone   IVPB   100 mL/Hr IV Intermittent (22 @ 17:11)    piperacillin/tazobactam IVPB.   200 mL/Hr IV Intermittent (22 @ 22:19)    piperacillin/tazobactam IVPB..   25 mL/Hr IV Intermittent (22 @ 09:08)   25 mL/Hr IV Intermittent (22 @ 02:56)        piperacillin/tazobactam IVPB.. 3.375 every 8 hours    MEDICATIONS  (STANDING):  dextrose 50% Injectable 25 once  dextrose 50% Injectable 12.5 once  dextrose 50% Injectable 25 once  dextrose Oral Gel 15 once PRN  glucagon  Injectable 1 once  heparin   Injectable 5000 every 8 hours  insulin glargine Injectable (LANTUS) 5 at bedtime  insulin lispro (ADMELOG) corrective regimen sliding scale  three times a day before meals  insulin lispro (ADMELOG) corrective regimen sliding scale  at bedtime    SOCIAL HISTORY:     Lives in [place] in government housing. Originally from [facility], [place] ago. Works as , has asbestos exposure. Former smoker, quit 6 months ago, formerly smoking .5 PPD x 10 years. No alcohol, no drugs. In monogamous sexual relationship with one female, no hx of STDs. No travel hx.     FAMILY HISTORY:  Unknown, pt poor historian    REVIEW OF SYSTEMS  [  ] ROS unobtainable because:    [ X ] All other systems negative except as noted below:	    Constitutional:  [x ] fever [ x] chills  [ ] weight loss  [ ] weakness  Skin:  [ ] rash [ ] phlebitis	  Eyes: [ ] icterus [ ] pain  [ ] discharge	  ENMT: [ ] sore throat  [ ] thrush [ ] ulcers [ ] exudates  Respiratory: [ ] dyspnea [ ] hemoptysis [ ] cough [ ] sputum	  Cardiovascular:  [ ] chest pain [ ] palpitations [ ] edema	  Gastrointestinal:  [ ] nausea [ ] vomiting [ ] diarrhea [ ] constipation [ ] pain	  Genitourinary:  [ ] dysuria [ ] frequency [ ] hematuria [ ] discharge [ ] flank pain  [ ] incontinence  Musculoskeletal:  [ ] myalgias [ ] arthralgias [ ] arthritis  [ ] back pain  Neurological:  [ ] headache [ ] seizures  [ ] confusion/altered mental status  Psychiatric:  [ ] anxiety [ ] depression	  Hematology/Lymphatics:  [ ] lymphadenopathy  Endocrine:  [ ] adrenal [ ] thyroid  Allergic/Immunologic:	 [ ] transplant [ ] seasonal    Vital Signs Last 24 Hrs  T(F): 98.7 (22 @ 10:30), Max: 102.4 (22 @ 23:49)  Vital Signs Last 24 Hrs  HR: 89 (22 @ 10:30) (78 - 116)  BP: 136/68 (22 @ 10:30) (135/81 - 175/75)  RR: 18 (22 @ 10:30)  SpO2: 99% (22 @ 10:30) (97% - 99%)  Wt(kg): --    PHYSICAL EXAM:  Constitutional: non-toxic, no distress  HEAD/EYES: anicteric, no conjunctival injection  ENT:  supple, no thrush  Cardiovascular:   systolic murmur best auscultated at right upper sternal border with radiation to carotid artery, regular rate and rhythm  Respiratory:  clear BS bilaterally, no wheezes, no rales  GI:  soft, +TTP in epigastric and RLQ  :  no chaudhary, no CVA tenderness  Musculoskeletal:  no synovitis, normal ROM  Neurologic: awake and alert, normal strength, no focal findings  Skin:  no rash, no erythema, no phlebitis  Heme/Onc: no lymphadenopathy   Psychiatric:  awake, alert, appropriate mood                            10.4   12.50 )-----------( 152      ( 08 Aug 2022 15:20 )             31.7   08-    129<L>  |  93<L>  |  34<H>  ----------------------------<  287<H>  4.0   |  26  |  1.48<H>    Ca    8.8      09 Aug 2022 06:20  Mg     2.1         TPro  7.0  /  Alb  2.9<L>  /  TBili  0.9  /  DBili  x   /  AST  13  /  ALT  13  /  AlkPhos  116  08    Urinalysis Basic - ( 08 Aug 2022 16:19 )    Color: Yellow / Appearance: Slightly Turbid / S.017 / pH: x  Gluc: x / Ketone: Negative  / Bili: Negative / Urobili: 2 mg/dL   Blood: x / Protein: 100 mg/dL / Nitrite: Positive   Leuk Esterase: Large / RBC: 37 /hpf /  /HPF   Sq Epi: x / Non Sq Epi: 0 /hpf / Bacteria: Many    MICROBIOLOGY:  Culture - Blood (collected 08 Aug 2022 14:45)  Source: .Blood Blood-Peripheral  Gram Stain (09 Aug 2022 06:34):    Growth in aerobic and anaerobic bottles: Gram Negative Rods  Preliminary Report (09 Aug 2022 06:35):    Growth in aerobic and anaerobic bottles: Gram Negative Rods    Culture - Blood (collected 08 Aug 2022 14:40)  Source: .Blood Blood-Peripheral  Gram Stain (09 Aug 2022 06:41):    Growth in aerobic bottle: Gram Negative Rods and Gram positive cocci in    pairs  Preliminary Report (09 Aug 2022 06:56):    Growth in aerobic bottle: Gram Negative Rods and Gram positive cocci in    pairs    ***Blood Panel PCR results on this specimen are available    approximately 3 hours after the Gram stain result.***    Gram stain, PCR, and/or culture results may not always    correspond due to difference in methodologies.    ************************************************************    This PCR assay was performed by multiplex PCR. This    Assay tests for 66 bacterial and resistance gene targets.    Please refer to the [facility] test directory    at [email or web address] for details.  Organism: Blood Culture PCR (09 Aug 2022 09:35)  Organism: Blood Culture PCR (09 Aug 2022 09:35)      -  Coagulase negative Staphylococcus: Detec      -  Escherichia coli: Detec      Method Type: PCR                RADIOLOGY:  < from: CT Abdomen and Pelvis No Cont (22 @ 19:12) >   No obstructing renal stones or hydronephrosis.  Mildly edematous left kidney with b/l non-specific fat stranding.  Mild symmetric bladder wall thickening.  Finding likely represent ascending urinary tract infection. Recommend   clinical correlation w/U/A.  Nonspecific left-sided predominant subcentimer in short-axis   retroperitoneal LNs  f/u official AM report.    < end of copied text >    < from: Xray Chest 1 View- PORTABLE-Urgent (22 @ 15:06) >  Clear lungs.    < end of copied text >     Patient is a 67y old  Male who presents with a chief complaint of Urinary tract infection     (09 Aug 2022 10:51)    HPI:  Mr. Smith is a 66 yo gentleman with no known PMH, does not regularly see doctors, presents to the ED for generalized weakness/fatigue, decreased PO intake, and polydipsia. Since the last 4 days, he has felt like he is urinating more frequently than normal but denies any dysuria. Also endorses intermittent subjective fevers/chills  at this time. Pt reports he does not see doctors because he is never feels sick. He reports being seen years ago at [facility] after he hit his head, but has not followed up since.     In the field, pt's FS was found to be 400s. On arrival to the ED was found to be hypothermic to 94.9F, otherwise vitally stable.     In the ED, pt was started on HISS, IVF, FS in 300s, positive UA, WBC 12. Started on empiric zosyn.     CT showing bladder wall thickening with edematous L kidney and b/l fat stranding likely c/w ascending UTI.    ID consulted for  Bcx GNR bacteremia.         PAST MEDICAL & SURGICAL HISTORY:  No pertinent past medical history          Allergies  No Known Allergies    ANTIMICROBIALS (past 90 days)  MEDICATIONS  (STANDING):  cefTRIAXone   IVPB   100 mL/Hr IV Intermittent (22 @ 17:11)    piperacillin/tazobactam IVPB.   200 mL/Hr IV Intermittent (22 @ 22:19)    piperacillin/tazobactam IVPB..   25 mL/Hr IV Intermittent (22 @ 09:08)   25 mL/Hr IV Intermittent (22 @ 02:56)        piperacillin/tazobactam IVPB.. 3.375 every 8 hours    MEDICATIONS  (STANDING):  dextrose 50% Injectable 25 once  dextrose 50% Injectable 12.5 once  dextrose 50% Injectable 25 once  dextrose Oral Gel 15 once PRN  glucagon  Injectable 1 once  heparin   Injectable 5000 every 8 hours  insulin glargine Injectable (LANTUS) 5 at bedtime  insulin lispro (ADMELOG) corrective regimen sliding scale  three times a day before meals  insulin lispro (ADMELOG) corrective regimen sliding scale  at bedtime    SOCIAL HISTORY:     Lives in [place] in government housing. Originally from [place], [place]. Works as , has asbestos exposure. Former smoker, quit 6 months ago, formerly smoking .5 PPD x 10 years. No alcohol, no drugs. In monogamous sexual relationship with one female, no hx of STDs. No travel hx.     FAMILY HISTORY:  Unknown, pt poor historian    REVIEW OF SYSTEMS  [  ] ROS unobtainable because:    [ X ] All other systems negative except as noted below:	    Constitutional:  [x ] fever [ x] chills  [ ] weight loss  [ ] weakness  Skin:  [ ] rash [ ] phlebitis	  Eyes: [ ] icterus [ ] pain  [ ] discharge	  ENMT: [ ] sore throat  [ ] thrush [ ] ulcers [ ] exudates  Respiratory: [ ] dyspnea [ ] hemoptysis [ ] cough [ ] sputum	  Cardiovascular:  [ ] chest pain [ ] palpitations [ ] edema	  Gastrointestinal:  [ ] nausea [ ] vomiting [ ] diarrhea [ ] constipation [ ] pain	  Genitourinary:  [ ] dysuria [ ] frequency [ ] hematuria [ ] discharge [ ] flank pain  [ ] incontinence  Musculoskeletal:  [ ] myalgias [ ] arthralgias [ ] arthritis  [ ] back pain  Neurological:  [ ] headache [ ] seizures  [ ] confusion/altered mental status  Psychiatric:  [ ] anxiety [ ] depression	  Hematology/Lymphatics:  [ ] lymphadenopathy  Endocrine:  [ ] adrenal [ ] thyroid  Allergic/Immunologic:	 [ ] transplant [ ] seasonal    Vital Signs Last 24 Hrs  T(F): 98.7 (22 @ 10:30), Max: 102.4 (22 @ 23:49)  Vital Signs Last 24 Hrs  HR: 89 (22 @ 10:30) (78 - 116)  BP: 136/68 (22 @ 10:30) (135/81 - 175/75)  RR: 18 (22 @ 10:30)  SpO2: 99% (22 @ 10:30) (97% - 99%)  Wt(kg): --    PHYSICAL EXAM:  Constitutional: non-toxic, no distress  HEAD/EYES: anicteric, no conjunctival injection  ENT:  supple, no thrush  Cardiovascular:   systolic murmur best auscultated at right upper sternal border with radiation to carotid artery, regular rate and rhythm  Respiratory:  clear BS bilaterally, no wheezes, no rales  GI:  soft, +TTP in epigastric and RLQ  :  no chaudhary, no CVA tenderness  Musculoskeletal:  no synovitis, normal ROM  Neurologic: awake and alert, normal strength, no focal findings  Skin:  no rash, no erythema, no phlebitis  Heme/Onc: no lymphadenopathy   Psychiatric:  awake, alert, appropriate mood                            10.4   12.50 )-----------( 152      ( 08 Aug 2022 15:20 )             31.7   08-    129<L>  |  93<L>  |  34<H>  ----------------------------<  287<H>  4.0   |  26  |  1.48<H>    Ca    8.8      09 Aug 2022 06:20  Mg     2.1         TPro  7.0  /  Alb  2.9<L>  /  TBili  0.9  /  DBili  x   /  AST  13  /  ALT  13  /  AlkPhos  116  08    Urinalysis Basic - ( 08 Aug 2022 16:19 )    Color: Yellow / Appearance: Slightly Turbid / S.017 / pH: x  Gluc: x / Ketone: Negative  / Bili: Negative / Urobili: 2 mg/dL   Blood: x / Protein: 100 mg/dL / Nitrite: Positive   Leuk Esterase: Large / RBC: 37 /hpf /  /HPF   Sq Epi: x / Non Sq Epi: 0 /hpf / Bacteria: Many    MICROBIOLOGY:  Culture - Blood (collected 08 Aug 2022 14:45)  Source: .Blood Blood-Peripheral  Gram Stain (09 Aug 2022 06:34):    Growth in aerobic and anaerobic bottles: Gram Negative Rods  Preliminary Report (09 Aug 2022 06:35):    Growth in aerobic and anaerobic bottles: Gram Negative Rods    Culture - Blood (collected 08 Aug 2022 14:40)  Source: .Blood Blood-Peripheral  Gram Stain (09 Aug 2022 06:41):    Growth in aerobic bottle: Gram Negative Rods and Gram positive cocci in    pairs  Preliminary Report (09 Aug 2022 06:56):    Growth in aerobic bottle: Gram Negative Rods and Gram positive cocci in    pairs    ***Blood Panel PCR results on this specimen are available    approximately 3 hours after the Gram stain result.***    Gram stain, PCR, and/or culture results may not always    correspond due to difference in methodologies.    ************************************************************    This PCR assay was performed by multiplex PCR. This    Assay tests for 66 bacterial and resistance gene targets.    Please refer to the [facility] test directory    at [email or web address] for details.  Organism: Blood Culture PCR (09 Aug 2022 09:35)  Organism: Blood Culture PCR (09 Aug 2022 09:35)      -  Coagulase negative Staphylococcus: Detec      -  Escherichia coli: Detec      Method Type: PCR    RADIOLOGY:    <The imaging below has been reviewed and visualized by me independently. Findings as detailed in report below>    < from: CT Abdomen and Pelvis No Cont (22 @ 19:12) >   No obstructing renal stones or hydronephrosis.  Mildly edematous left kidney with b/l non-specific fat stranding.  Mild symmetric bladder wall thickening.  Finding likely represent ascending urinary tract infection. Recommend   clinical correlation w/U/A.  Nonspecific left-sided predominant subcentimer in short-axis   retroperitoneal LNs  f/u official AM report.    < end of copied text >    < from: Xray Chest 1 View- PORTABLE-Urgent (22 @ 15:06) >  Clear lungs.    < end of copied text >

## 2022-08-09 NOTE — CONSULT NOTE ADULT - ASSESSMENT
A/P:67y Male  with PMH with history noted above.    1.  DM  - T2DM/T1DM  - Most recent Hemoglobin A1C  - Current FS ranges from   - Current inpatient regimen:  - Current diet:  - Please monitor blood glucose values TID AC & QHS while eating regular meals and Q6H while NPO  - Blood glucose goals pre-meal less than 140 mg/dL and random blood glucose less than 180 mg/dL  - Recommendations:  - insulin Glargine  - insulin Lispro TID with meals, hold if NPO or if eating less than 50% of meals  - Correctional scale TID with meals  - Correctional scale QHS    2. HTN  - BP goal 130/80  - Current BP  - Manage per primary team     3. HLD  - LDL  - Manage as outpatient      Will continue to monitor     Discharge planning:  -          Fabby Hayden MD  Department of Endocrinology, diabetes and metabolism   Pager [phone] Patient is a 67 M with no known PMH, does not regularly see doctors here for generalized weakness and fatigue, decreased PO intake, polydipsia admitted for weakness. Patient found to have gram negative rods bacteremia now on Zosyn. Endocrinology consulted for newly diagnosed diabetes.     1.  Newly diagnosed DM with Symptomatic hyperglycemia  - Likely has T2D based on body habitus   - Most recent Hemoglobin A1C 11.4  - Current FS ranges from  260-300   - Current inpatient regimen: lantus 5, LDSS  - Current diet: CHO/DASH diet   - Please monitor blood glucose values TID AC & QHS while eating regular meals and Q6H while NPO  - Blood glucose goals pre-meal less than 140 mg/dL and random blood glucose less than 180 mg/dL  - Recommendations:   - Weight based insulin naive dosing 0.4x81~32 units of TDD,   - Start insulin Glargine 15 units QHS  - Start insulin Lispro 5 units TID with meals, hold if NPO or if eating less than 50% of meals  - Continue with low dose correctional scale TID with meals  - Continue with low dose Correctional scale QHS    2. HTN  - BP goal 130/80  - Current -170/70-80  - Manage per primary team     3. HLD  - Check am lipid profile   - Patient will likely benefit from statin therapy   - Manage as outpatient      Thank you for the consult. Will continue to monitor     Discharge planning:  -       Fabby Hayden MD  Department of Endocrinology, diabetes and metabolism   Pager [phone] Patient is a 67 M with no known PMH, does not regularly see doctors here for generalized weakness and fatigue, decreased PO intake, polydipsia admitted for weakness. Patient found to have gram negative rods bacteremia now on Zosyn. Endocrinology consulted for newly diagnosed diabetes.     1.  Newly diagnosed DM with Symptomatic hyperglycemia  - Likely has T2D based on body habitus   - Most recent Hemoglobin A1C 11.4  - Current FS ranges from  260-300   - Current inpatient regimen: lantus 5, LDSS  - Current diet: CHO/DASH diet   - Please monitor blood glucose values TID AC & QHS while eating regular meals and Q6H while NPO  - Blood glucose goals pre-meal less than 140 mg/dL and random blood glucose less than 180 mg/dL  - Recommendations:   - Weight based insulin naive dosing 0.4x81~32 units of TDD,   - Start insulin Glargine 15 units QHS  - Start insulin Lispro 5 units TID with meals, hold if NPO or if eating less than 50% of meals  - Continue with low dose correctional scale TID with meals  - Continue with low dose Correctional scale QHS    2. HTN  - BP goal 130/80  - Current -170/70-80  - Manage per primary team     3. HLD  - Check am lipid profile   - LDL goal <70  - Patient will likely benefit from statin therapy   - Manage as outpatient      Thank you for the consult. Will continue to monitor. Discussed with the primary team.     Discharge planning:  - Likely Basal/bolus and metformin depending on insulin requirements   - Recommend outpatient endocrinology follow up as patient will need dilated eye exam, follow up with podiatry  - Nutrition consult   - Patient will need insulin pen teaching, glucose testing teaching as well   - Please teach and allow patient to do own finger sticks and insulin injections under RN supervision  - Please teach use of insulin pen  - Please document teach back  - Patient will also need all diabetes supplies including, lancets, glucometer, testing strips, insulin pens, needles at time of discharge     Fabby Hayden MD  Department of Endocrinology, diabetes and metabolism   Pager [phone]

## 2022-08-09 NOTE — DIETITIAN INITIAL EVALUATION ADULT - COLLABORATION WITH OTHER PROVIDERS
spoke with MD, RN at bedside with patient to affirm need for diet adherence, pt very receptive to diet education

## 2022-08-09 NOTE — DIETITIAN INITIAL EVALUATION ADULT - PERTINENT LABORATORY DATA
08-09    129<L>  |  93<L>  |  34<H>  ----------------------------<  287<H>  4.0   |  26  |  1.48<H>    Ca    8.8      09 Aug 2022 06:20  Mg     2.1     08-08    TPro  7.0  /  Alb  2.9<L>  /  TBili  0.9  /  DBili  x   /  AST  13  /  ALT  13  /  AlkPhos  116  08-08  POCT Blood Glucose.: 283 mg/dL (08-09-22 @ 08:26)

## 2022-08-09 NOTE — DIETITIAN INITIAL EVALUATION ADULT - NS FNS DIET ORDER
Diet, DASH/TLC:   Sodium & Cholesterol Restricted  Consistent Carbohydrate {Evening Snack} (CSTCHOSN) (08-08-22 @ 20:07)

## 2022-08-09 NOTE — DIETITIAN INITIAL EVALUATION ADULT - PERTINENT MEDS FT
MEDICATIONS  (STANDING):  chlorhexidine 4% Liquid 1 Application(s) Topical daily  dextrose 5%. 1000 milliLiter(s) (50 mL/Hr) IV Continuous <Continuous>  dextrose 5%. 1000 milliLiter(s) (100 mL/Hr) IV Continuous <Continuous>  dextrose 50% Injectable 25 Gram(s) IV Push once  dextrose 50% Injectable 12.5 Gram(s) IV Push once  dextrose 50% Injectable 25 Gram(s) IV Push once  glucagon  Injectable 1 milliGRAM(s) IntraMuscular once  heparin   Injectable 5000 Unit(s) SubCutaneous every 8 hours  insulin glargine Injectable (LANTUS) 5 Unit(s) SubCutaneous at bedtime  insulin lispro (ADMELOG) corrective regimen sliding scale   SubCutaneous three times a day before meals  insulin lispro (ADMELOG) corrective regimen sliding scale   SubCutaneous at bedtime  lactated ringers. 1000 milliLiter(s) (75 mL/Hr) IV Continuous <Continuous>  piperacillin/tazobactam IVPB.. 3.375 Gram(s) IV Intermittent every 8 hours    MEDICATIONS  (PRN):  dextrose Oral Gel 15 Gram(s) Oral once PRN Blood Glucose LESS THAN 70 milliGRAM(s)/deciliter

## 2022-08-09 NOTE — CONSULT NOTE ADULT - ASSESSMENT
68 yo M unknown PMH, has not been to doctor in many years, presenting with weakness, fatigue, decreased po intake, polydipsia/polyuria, and intermittent fever/chills, found to have +UA and Bcx growing E. coli, with CT showing evidence of pyelonephritis.    #E. coli bacteremia secondary to pyelonephritis  - pt with polyuria x 3 days  - UA 37 RBC, 210 WBC, many bacteria, large LE, nitrite +  - 8/8 CT A/P: bladder wall thickening and edematous L kidney consistent with ascending UTI   - has not been to doctor in many years, found to have A1C 11.4, likely contributing to his polydipsia but also increasing risk of ascending urinary infection  - 8/8 Bcx with E. coli and CoNS  - s/p 1 dose ceftriaxone in ED  - currently on empiric zosyn (8/8-  - recommend de-escalation to ceftriaxone 1g qd to complete 10 day course (end date 8/17) for now given no ESBL gene detected on PCR  - will f/u sensitivities to determine po abx on discharge  - CoNS likely contaminant, reculture today to confirm    Case discussed with Dr. Madrid and Dr. Liban Da Silva MD  PGY1 68 yo M unknown PMH, has not been to doctor in many years, presenting with weakness, fatigue, decreased po intake, polydipsia/polyuria, and intermittent fever/chills, found to have +UA and Bcx growing E. coli, with CT showing evidence of pyelonephritis.    #E. coli bacteremia, Pyelonephritis, Leukocytosis, Fever, Positive Culture Finding (Coag Neg Staph on BCx)  - pt with polyuria x 3 days  - UA 37 RBC, 210 WBC, many bacteria, large LE, nitrite +  - 8/8 CT A/P: bladder wall thickening and edematous L kidney consistent with ascending UTI   - has not been to doctor in many years, found to have A1C 11.4, likely contributing to his polydipsia but also increasing risk of ascending urinary infection  - 8/8 Bcx with E. coli and CoNS  - s/p 1 dose ceftriaxone in ED  - currently on empiric zosyn (8/8-  - recommend de-escalation to ceftriaxone 1g qd to complete 10 day course (end date 8/17) for now given no ESBL gene detected on PCR  - will f/u sensitivities to determine po abx on discharge  - CoNS likely contaminant, reculture today to confirm    Case discussed with Dr. Madrid and Dr. Liban Da Silva MD  PGY1

## 2022-08-09 NOTE — PROGRESS NOTE ADULT - SUBJECTIVE AND OBJECTIVE BOX
Patient is a 67y old  Male who presents with a chief complaint of Weakness x 4 days (09 Aug 2022 14:38)      SUBJECTIVE / OVERNIGHT EVENTS: patient spoking fevers  Girl Friend Bed side    MEDICATIONS  (STANDING):  cefTRIAXone   IVPB 1000 milliGRAM(s) IV Intermittent every 24 hours  chlorhexidine 4% Liquid 1 Application(s) Topical daily  dextrose 5%. 1000 milliLiter(s) (50 mL/Hr) IV Continuous <Continuous>  dextrose 5%. 1000 milliLiter(s) (100 mL/Hr) IV Continuous <Continuous>  dextrose 50% Injectable 25 Gram(s) IV Push once  dextrose 50% Injectable 12.5 Gram(s) IV Push once  dextrose 50% Injectable 25 Gram(s) IV Push once  glucagon  Injectable 1 milliGRAM(s) IntraMuscular once  heparin   Injectable 5000 Unit(s) SubCutaneous every 8 hours  insulin glargine Injectable (LANTUS) 15 Unit(s) SubCutaneous at bedtime  insulin lispro (ADMELOG) corrective regimen sliding scale   SubCutaneous three times a day before meals  insulin lispro (ADMELOG) corrective regimen sliding scale   SubCutaneous at bedtime  insulin lispro Injectable (ADMELOG) 5 Unit(s) SubCutaneous three times a day before meals  lactated ringers. 1000 milliLiter(s) (75 mL/Hr) IV Continuous <Continuous>    MEDICATIONS  (PRN):  dextrose Oral Gel 15 Gram(s) Oral once PRN Blood Glucose LESS THAN 70 milliGRAM(s)/deciliter      CAPILLARY BLOOD GLUCOSE      POCT Blood Glucose.: 138 mg/dL (09 Aug 2022 21:44)  POCT Blood Glucose.: 212 mg/dL (09 Aug 2022 17:00)  POCT Blood Glucose.: 265 mg/dL (09 Aug 2022 11:57)  POCT Blood Glucose.: 283 mg/dL (09 Aug 2022 08:26)  POCT Blood Glucose.: 277 mg/dL (09 Aug 2022 04:30)    I&O's Summary    08 Aug 2022 07:01  -  09 Aug 2022 07:00  --------------------------------------------------------  IN: 0 mL / OUT: 1000 mL / NET: -1000 mL    09 Aug 2022 07:01  -  09 Aug 2022 23:33  --------------------------------------------------------  IN: 490 mL / OUT: 400 mL / NET: 90 mL      T(C): 37.7 (22 @ 19:59), Max: 37.9 (22 @ 17:30)  HR: 93 (22 @ 19:59) (79 - 93)  BP: 126/73 (22 @ 19:59) (126/73 - 135/70)  RR: 18 (22 @ 19:59) (18 - 18)  SpO2: 98% (22 @ 19:59) (98% - 99%)    PHYSICAL EXAM:  GENERAL: NAD, well-developed  HEAD:  Atraumatic, Normocephalic  EYES: EOMI, PERRLA, conjunctiva and sclera clear  NECK: Supple, No JVD  CHEST/LUNG: Clear to auscultation bilaterally; No wheeze  HEART: Regular rate and rhythm; No murmurs, rubs, or gallops  ABDOMEN: Soft, Nontender, Nondistended; Bowel sounds present  EXTREMITIES:  2+ Peripheral Pulses, No clubbing, cyanosis, or edema  PSYCH: AAOx3  NEUROLOGY: non-focal  SKIN: No rashes or lesions    LABS:                        10.1   9.33  )-----------( 164      ( 09 Aug 2022 20:26 )             30.7         129<L>  |  93<L>  |  34<H>  ----------------------------<  287<H>  4.0   |  26  |  1.48<H>    Ca    8.8      09 Aug 2022 06:20  Mg     2.1         TPro  7.0  /  Alb  2.9<L>  /  TBili  0.9  /  DBili  x   /  AST  13  /  ALT  13  /  AlkPhos  116  08-08    PT/INR - ( 08 Aug 2022 15:20 )   PT: 15.7 sec;   INR: 1.36 ratio         PTT - ( 08 Aug 2022 15:20 )  PTT:18.3 sec      Urinalysis Basic - ( 08 Aug 2022 16:19 )    Color: Yellow / Appearance: Slightly Turbid / S.017 / pH: x  Gluc: x / Ketone: Negative  / Bili: Negative / Urobili: 2 mg/dL   Blood: x / Protein: 100 mg/dL / Nitrite: Positive   Leuk Esterase: Large / RBC: 37 /hpf /  /HPF   Sq Epi: x / Non Sq Epi: 0 /hpf / Bacteria: Many        RADIOLOGY & ADDITIONAL TESTS:    Imaging Personally Reviewed:    Consultant(s) Notes Reviewed:      Care Discussed with Consultants/Other Providers:

## 2022-08-09 NOTE — CONSULT NOTE ADULT - SUBJECTIVE AND OBJECTIVE BOX
HPI:  Patient is a 67 M with no known PMH, does not regularly see doctors here for generalized weakness and fatigue, decreased PO intake, polydipsia admitted for weakness. Patient found to have gram negative rods bacteremia now on Zosyn. Endocrinology consulted for newly diagnosed diabetes.     Patient endorses polydipsia and polyuria. Also has decrease PO intake. He also endorses subjective fevers. Denies nausea or vomiting.     Lab significant for leukocytosis of WBC 12.5, microcytic anemia with Hb 10.4, POCT glucose found to be 357 on admission. Negative BHB. HbA1C 11.4. Cr 1.58 with GFR 48. TSH 0.81. Urine shows negative ketone, glucosuria. CT A/P shows possible pyelonephritis and Bilateral adrenal gland thickening, left greater than right. Patient received Lantus 5 units at 10 pm on .     Inpatient DM meds:  - Lantus 5 units QHS  - LDSS    Diabetes history:  Age at Dx:  How dx:  Hx and duration of insulin:  Hx of hypoglycemia:  Hx of DKA/HHS?  Complications:   Outpatient Endo:  Most recent A1C    Home DM medications:  - None, never been diagnosed with diabetes      PMH & Surgical Hx:Urinary tract infection    FH:    SH:  Smoking:  Etoh:  Recreational Drugs:  Social Life:    Current inpatient DM Meds:     Current Meds:  cefTRIAXone   IVPB 1000 milliGRAM(s) IV Intermittent every 24 hours  chlorhexidine 4% Liquid 1 Application(s) Topical daily  dextrose 5%. 1000 milliLiter(s) IV Continuous <Continuous>  dextrose 5%. 1000 milliLiter(s) IV Continuous <Continuous>  dextrose 50% Injectable 25 Gram(s) IV Push once  dextrose 50% Injectable 12.5 Gram(s) IV Push once  dextrose 50% Injectable 25 Gram(s) IV Push once  dextrose Oral Gel 15 Gram(s) Oral once PRN  glucagon  Injectable 1 milliGRAM(s) IntraMuscular once  heparin   Injectable 5000 Unit(s) SubCutaneous every 8 hours  insulin glargine Injectable (LANTUS) 5 Unit(s) SubCutaneous at bedtime  insulin lispro (ADMELOG) corrective regimen sliding scale   SubCutaneous three times a day before meals  insulin lispro (ADMELOG) corrective regimen sliding scale   SubCutaneous at bedtime  lactated ringers. 1000 milliLiter(s) IV Continuous <Continuous>      Allergies:  No Known Allergies      ROS:  Denies the following except as indicated.    General: weight loss/weight gain, decreased appetite, fatigue  Eyes: Blurry vision, double vision, visual changes  ENT: Throat pain, changes in voice,   CV: palpitations, SOB, CP, cough  GI: NVD, difficulty swallowing, abdominal pain  : polyuria, dysuria  Endo: abnormal menses, temperature intolerance, decreased libido  MSK: weakness, joint pain  Skin: rash, dryness, diaphoresis  Heme: Easy bruising,bleeding  Neuro: HA, dizziness, lightheadedness, numbness tingling  Psych: Anxiety, Depression    Vital Signs Last 24 Hrs  T(C): 36.9 (09 Aug 2022 12:00), Max: 39.1 (08 Aug 2022 23:49)  T(F): 98.4 (09 Aug 2022 12:00), Max: 102.4 (08 Aug 2022 23:49)  HR: 79 (09 Aug 2022 12:00) (78 - 111)  BP: 135/70 (09 Aug 2022 12:00) (135/70 - 175/75)  BP(mean): 104 (08 Aug 2022 19:19) (104 - 107)  RR: 18 (09 Aug 2022 12:00) (18 - 20)  SpO2: 99% (09 Aug 2022 12:00) (97% - 99%)    Parameters below as of 09 Aug 2022 12:00  Patient On (Oxygen Delivery Method): room air      Height (cm): 180.3 ( @ 19:04)  Weight (kg): 81.6 ( @ 19:04)  BMI (kg/m2): 25.1 ( @ 19:04)      Constitutional: wn/wd in NAD.   HEENT: NCAT, MMM, OP clear, EOMI, , no proptosis or lid retraction  Neck: no thyromegaly or palpable thyroid nodules   Respiratory: lungs CTAB.  Cardiovascular: regular rhythm, normal S1 and S2, no audible murmurs, no peripheral edema  GI: soft, NT/ND, no masses/HSM appreciated.  Neurology: no tremors, DTR 2+  Skin: no visible rashes/lesions  Psychiatric: AAO x 3, normal affect/mood.  Ext: radial pulses intact, DP pulses intact, extremities warm, no cyanosis, clubbing or edema.       LABS:                        10.4   12.50 )-----------( 152      ( 08 Aug 2022 15:20 )             31.7     08-    129<L>  |  93<L>  |  34<H>  ----------------------------<  287<H>  4.0   |  26  |  1.48<H>    Ca    8.8      09 Aug 2022 06:20  Mg     2.1         TPro  7.0  /  Alb  2.9<L>  /  TBili  0.9  /  DBili  x   /  AST  13  /  ALT  13  /  AlkPhos  116      PT/INR - ( 08 Aug 2022 15:20 )   PT: 15.7 sec;   INR: 1.36 ratio         PTT - ( 08 Aug 2022 15:20 )  PTT:18.3 sec  Urinalysis Basic - ( 08 Aug 2022 16:19 )    Color: Yellow / Appearance: Slightly Turbid / S.017 / pH: x  Gluc: x / Ketone: Negative  / Bili: Negative / Urobili: 2 mg/dL   Blood: x / Protein: 100 mg/dL / Nitrite: Positive   Leuk Esterase: Large / RBC: 37 /hpf /  /HPF   Sq Epi: x / Non Sq Epi: 0 /hpf / Bacteria: Many        Thyroid Stimulating Hormone, Serum: 0.81 ( @ 15:21)      RADIOLOGY & ADDITIONAL STUDIES:  CAPILLARY BLOOD GLUCOSE      POCT Blood Glucose.: 265 mg/dL (09 Aug 2022 11:57)  POCT Blood Glucose.: 283 mg/dL (09 Aug 2022 08:26)  POCT Blood Glucose.: 277 mg/dL (09 Aug 2022 04:30)  POCT Blood Glucose.: 295 mg/dL (08 Aug 2022 22:30)         HPI:  Patient is a 67 M with no known PMH, does not regularly see doctors here for generalized weakness and fatigue, decreased PO intake, polydipsia admitted for weakness. Patient found to have gram negative rods bacteremia now on Zosyn. Endocrinology consulted for newly diagnosed diabetes.     Patient endorses polydipsia and polyuria for the past 2 months. Also has decreased PO intake and some weight loss (cannot quantify how much). He also endorses subjective fevers. Denies nausea or vomiting.     Lab significant for leukocytosis of WBC 12.5, microcytic anemia with Hb 10.4, POCT glucose found to be 357 on admission. Negative BHB. HbA1C 11.4. Cr 1.58 with GFR 48. TSH 0.81. Urine shows negative ketone, glucosuria. CT A/P shows possible pyelonephritis and Bilateral adrenal gland thickening, left greater than right. Patient received Lantus 5 units at 10 pm on .     Inpatient DM meds:  - Lantus 5 units QHS  - LDSS    Diabetes history:  Never been told that he has diabetes. Does not regularly see a doctor.     Most recent A1C 11.4    Home DM medications:  - None, never been diagnosed with diabetes      PMH & Surgical Hx:Urinary tract infection    FH: denies family history of diabetes, cardiac disease, or HTN    SH:  Smoking: denies  Etoh: occasional alcohol use  Recreational Drugs: denies  Social Life: lives by himself     Current inpatient DM Meds:   insulin glargine Injectable (LANTUS) 5 Unit(s) SubCutaneous at bedtime  insulin lispro (ADMELOG) corrective regimen sliding scale   SubCutaneous three times a day before meals  insulin lispro (ADMELOG) corrective regimen sliding scale   SubCutaneous at bedtime    Current Meds:  cefTRIAXone   IVPB 1000 milliGRAM(s) IV Intermittent every 24 hours  chlorhexidine 4% Liquid 1 Application(s) Topical daily  dextrose 5%. 1000 milliLiter(s) IV Continuous <Continuous>  dextrose 5%. 1000 milliLiter(s) IV Continuous <Continuous>  dextrose 50% Injectable 25 Gram(s) IV Push once  dextrose 50% Injectable 12.5 Gram(s) IV Push once  dextrose 50% Injectable 25 Gram(s) IV Push once  dextrose Oral Gel 15 Gram(s) Oral once PRN  glucagon  Injectable 1 milliGRAM(s) IntraMuscular once  heparin   Injectable 5000 Unit(s) SubCutaneous every 8 hours  insulin glargine Injectable (LANTUS) 5 Unit(s) SubCutaneous at bedtime  insulin lispro (ADMELOG) corrective regimen sliding scale   SubCutaneous three times a day before meals  insulin lispro (ADMELOG) corrective regimen sliding scale   SubCutaneous at bedtime  lactated ringers. 1000 milliLiter(s) IV Continuous <Continuous>      Allergies:  No Known Allergies      ROS:    General: Endorses weight loss and decreased appetite   Eyes: Endorses occasional blurry vision  ENT: Denies Throat pain, changes in voice  CV: Denies palpitations, SOB, CP, cough  GI: Denies NVD, difficulty swallowing, abdominal pain  : Endorses polyuria, dysuria  MSK: Denies weakness, joint pain  Skin: Denies rash   Heme: Denies easy bruising   Neuro: Denies headache, dizziness, lightheadedness, endorses occasional numbness and tingling in his hands/toes    Vital Signs Last 24 Hrs  T(C): 36.9 (09 Aug 2022 12:00), Max: 39.1 (08 Aug 2022 23:49)  T(F): 98.4 (09 Aug 2022 12:00), Max: 102.4 (08 Aug 2022 23:49)  HR: 79 (09 Aug 2022 12:00) (78 - 111)  BP: 135/70 (09 Aug 2022 12:00) (135/70 - 175/75)  BP(mean): 104 (08 Aug 2022 19:19) (104 - 107)  RR: 18 (09 Aug 2022 12:00) (18 - 20)  SpO2: 99% (09 Aug 2022 12:00) (97% - 99%)    Parameters below as of 09 Aug 2022 12:00  Patient On (Oxygen Delivery Method): room air      Height (cm): 180.3 ( @ 19:04)  Weight (kg): 81.6 ( @ 19:04)  BMI (kg/m2): 25.1 ( @ 19:04)      Constitutional: wn/wd in NAD.   HEENT: NCAT, MMM, OP clear, EOMI, , no proptosis or lid retraction  Neck: no thyromegaly or palpable thyroid nodules   Respiratory: lungs CTAB.  Cardiovascular: regular rhythm, normal S1 and S2, no audible murmurs, no peripheral edema  GI: soft, NT/ND, no masses/HSM appreciated.  Neurology: no tremors, DTR 2+  Skin: no visible rashes   Psychiatric: AAO x 3, normal affect/mood.  Ext: radial pulses intact, DP pulses intact, extremities warm, no edema      LABS:                        10.4   12.50 )-----------( 152      ( 08 Aug 2022 15:20 )             31.7         129<L>  |  93<L>  |  34<H>  ----------------------------<  287<H>  4.0   |  26  |  1.48<H>    Ca    8.8      09 Aug 2022 06:20  Mg     2.1         TPro  7.0  /  Alb  2.9<L>  /  TBili  0.9  /  DBili  x   /  AST  13  /  ALT  13  /  AlkPhos  116      PT/INR - ( 08 Aug 2022 15:20 )   PT: 15.7 sec;   INR: 1.36 ratio         PTT - ( 08 Aug 2022 15:20 )  PTT:18.3 sec  Urinalysis Basic - ( 08 Aug 2022 16:19 )    Color: Yellow / Appearance: Slightly Turbid / S.017 / pH: x  Gluc: x / Ketone: Negative  / Bili: Negative / Urobili: 2 mg/dL   Blood: x / Protein: 100 mg/dL / Nitrite: Positive   Leuk Esterase: Large / RBC: 37 /hpf /  /HPF   Sq Epi: x / Non Sq Epi: 0 /hpf / Bacteria: Many        Thyroid Stimulating Hormone, Serum: 0.81 ( @ 15:21)      RADIOLOGY & ADDITIONAL STUDIES:  CAPILLARY BLOOD GLUCOSE      POCT Blood Glucose.: 265 mg/dL (09 Aug 2022 11:57)  POCT Blood Glucose.: 283 mg/dL (09 Aug 2022 08:26)  POCT Blood Glucose.: 277 mg/dL (09 Aug 2022 04:30)  POCT Blood Glucose.: 295 mg/dL (08 Aug 2022 22:30)      CT A/P         PROCEDURE DATE:  2022          INTERPRETATION:  CLINICAL INFORMATION: Urinary tract infection and sepsis    COMPARISON: None available    CONTRAST/COMPLICATIONS:  IV Contrast: NONE  Oral Contrast: NONE  Complications: None reported at time of study completion    PROCEDURE:  CT of the Abdomen and Pelvis was performed.  Sagittal and coronal reformats were performed.    FINDINGS:  Evaluation of the solid visceral organs and vasculature is limited due to   absence of IV contrast.    LOWER CHEST: Coronary artery calcifications. Partially imaged lipomatous   lesion of the right latissimus dorsi.    LIVER: Within normal limits.  BILE DUCTS: No ductal dilatation.  GALLBLADDER: Within normal limits.  SPLEEN: Within normal limits.  PANCREAS: Within normal limits.  ADRENALS: Bilateral adrenal gland thickening, left greater than right.  KIDNEYS/URETERS: There is a 1.6 cm cyst near the lower pole of the right   kidney. No hydronephrosis. A 2 mm nonobstructive stone of the lower pole   right kidney versus a vascular calcification. Left kidney is slightly   enlarged compared to the right and there is asymmetric perinephric   stranding. No renal or perirenal collection.    BLADDER: Bladder wall thickening without surrounding inflammation, likely   related to chronic outlet obstruction.  REPRODUCTIVE ORGANS: Prostate is enlarged, measuring 5 cm in diameter,   and contains calcifications.    BOWEL: Questionable wall thickening/thickened folds of the stomach,   poorly evaluated as it is under distended and there is no IV contrast. No   bowel obstruction. Appendix is normal.  PERITONEUM: No ascites.  VESSELS: Atherosclerotic changes.  RETROPERITONEUM/LYMPH NODES: Mildly enlarged left para-aortic lymph nodes   adjacent to the left kidney, possibly reactive.  ABDOMINAL WALL: Within normal limits.  BONES: Degenerative changes.    IMPRESSION:  1.  No hydronephrosis or ureteral stone. Mild enlargement of the left   kidney with asymmetric perinephric stranding; pyelonephritis is a   consideration. No evidence of an abscess.  2.  Mildly enlarged left para-aortic lymph nodes, possibly reactive.  3.  Questionable gastric wall-thickening may reflect gastritis.

## 2022-08-09 NOTE — DIETITIAN INITIAL EVALUATION ADULT - ORAL INTAKE PTA/DIET HISTORY
Patient  seen for diet education, new DM, A1C pending. FS by EMT in 400's. Patient  is alert and oriented, denies need for  phone, he  interested in talking about his diet and medical condition. Patient states his weight was 200 lbs  1 year ago. He has no family with DM. He usually eats "everywhere" as stated, home, in restaurants, on the street. He  nausea, vomiting, constipation, or diarrhea. He does not drink alcohol, he denies , heavy intake of sweets, or soda. He drinks 1/2 gallon of milk/week. Discussed sources of carbs, portion sizes, meal balancing. He eats suarez and eggs at breakfast.  Emphasis placed on medication coupled with diet to control BS and avoid consequences of uncontrolled DM. Patient lives with his girlfriend who cooks meals with patient often. Diet ed to be shared with people in his household.  Patient received diet education well. Plan to follow up at mealtime, using meals served here ideal meal balance and spacing. Patient  seen for diet education, new DM, A1C pending. FS by EMT in 400's. Patient  is alert and oriented, denies need for  phone, he  interested in talking about his diet and medical condition. Patient states his weight was 200 lbs  1 year ago. He has no family with DM. He usually eats "everywhere" as stated, home, in restaurants, on the street. He  nausea, vomiting, constipation, or diarrhea. He does not drink alcohol, he denies , heavy intake of sweets, or soda. He drinks 1/2 gallon of milk/week. Discussed sources of carbs, portion sizes, meal balancing. He eats suarez and eggs at breakfast.  Emphasis placed on medication coupled with diet to control BS and avoid consequences of uncontrolled DM. Patient lives with his girlfriend who cooks meals with patient often. Diet ed to be shared with people in his household, will provide educatioin when she visits today.  Patient received diet education well. Plan to follow up at mealtime, using meals served here ideal meal balance and spacing.

## 2022-08-10 LAB
-  AMIKACIN: SIGNIFICANT CHANGE UP
-  AMOXICILLIN/CLAVULANIC ACID: SIGNIFICANT CHANGE UP
-  AMPICILLIN/SULBACTAM: SIGNIFICANT CHANGE UP
-  AMPICILLIN: SIGNIFICANT CHANGE UP
-  AZTREONAM: SIGNIFICANT CHANGE UP
-  CEFAZOLIN: SIGNIFICANT CHANGE UP
-  CEFEPIME: SIGNIFICANT CHANGE UP
-  CEFOXITIN: SIGNIFICANT CHANGE UP
-  CEFTRIAXONE: SIGNIFICANT CHANGE UP
-  CIPROFLOXACIN: SIGNIFICANT CHANGE UP
-  ERTAPENEM: SIGNIFICANT CHANGE UP
-  GENTAMICIN: SIGNIFICANT CHANGE UP
-  IMIPENEM: SIGNIFICANT CHANGE UP
-  LEVOFLOXACIN: SIGNIFICANT CHANGE UP
-  MEROPENEM: SIGNIFICANT CHANGE UP
-  NITROFURANTOIN: SIGNIFICANT CHANGE UP
-  PIPERACILLIN/TAZOBACTAM: SIGNIFICANT CHANGE UP
-  TIGECYCLINE: SIGNIFICANT CHANGE UP
-  TOBRAMYCIN: SIGNIFICANT CHANGE UP
-  TRIMETHOPRIM/SULFAMETHOXAZOLE: SIGNIFICANT CHANGE UP
ANION GAP SERPL CALC-SCNC: 10 MMOL/L — SIGNIFICANT CHANGE UP (ref 5–17)
BUN SERPL-MCNC: 25 MG/DL — HIGH (ref 7–23)
CALCIUM SERPL-MCNC: 8.2 MG/DL — LOW (ref 8.4–10.5)
CHLORIDE SERPL-SCNC: 94 MMOL/L — LOW (ref 96–108)
CHOLEST SERPL-MCNC: 84 MG/DL — SIGNIFICANT CHANGE UP
CO2 SERPL-SCNC: 24 MMOL/L — SIGNIFICANT CHANGE UP (ref 22–31)
CREAT SERPL-MCNC: 1.14 MG/DL — SIGNIFICANT CHANGE UP (ref 0.5–1.3)
CULTURE RESULTS: SIGNIFICANT CHANGE UP
EGFR: 70 ML/MIN/1.73M2 — SIGNIFICANT CHANGE UP
GLUCOSE BLDC GLUCOMTR-MCNC: 135 MG/DL — HIGH (ref 70–99)
GLUCOSE BLDC GLUCOMTR-MCNC: 167 MG/DL — HIGH (ref 70–99)
GLUCOSE BLDC GLUCOMTR-MCNC: 195 MG/DL — HIGH (ref 70–99)
GLUCOSE BLDC GLUCOMTR-MCNC: 235 MG/DL — HIGH (ref 70–99)
GLUCOSE SERPL-MCNC: 227 MG/DL — HIGH (ref 70–99)
HCT VFR BLD CALC: 33.2 % — LOW (ref 39–50)
HDLC SERPL-MCNC: 15 MG/DL — LOW
HGB BLD-MCNC: 10.7 G/DL — LOW (ref 13–17)
LIPID PNL WITH DIRECT LDL SERPL: 42 MG/DL — SIGNIFICANT CHANGE UP
MCHC RBC-ENTMCNC: 22.6 PG — LOW (ref 27–34)
MCHC RBC-ENTMCNC: 32.2 GM/DL — SIGNIFICANT CHANGE UP (ref 32–36)
MCV RBC AUTO: 70 FL — LOW (ref 80–100)
METHOD TYPE: SIGNIFICANT CHANGE UP
MRSA PCR RESULT.: SIGNIFICANT CHANGE UP
NON HDL CHOLESTEROL: 70 MG/DL — SIGNIFICANT CHANGE UP
NRBC # BLD: 0 /100 WBCS — SIGNIFICANT CHANGE UP (ref 0–0)
ORGANISM # SPEC MICROSCOPIC CNT: SIGNIFICANT CHANGE UP
ORGANISM # SPEC MICROSCOPIC CNT: SIGNIFICANT CHANGE UP
PLATELET # BLD AUTO: 180 K/UL — SIGNIFICANT CHANGE UP (ref 150–400)
POTASSIUM SERPL-MCNC: 3.3 MMOL/L — LOW (ref 3.5–5.3)
POTASSIUM SERPL-SCNC: 3.3 MMOL/L — LOW (ref 3.5–5.3)
RBC # BLD: 4.74 M/UL — SIGNIFICANT CHANGE UP (ref 4.2–5.8)
RBC # FLD: 14.6 % — HIGH (ref 10.3–14.5)
S AUREUS DNA NOSE QL NAA+PROBE: SIGNIFICANT CHANGE UP
SODIUM SERPL-SCNC: 128 MMOL/L — LOW (ref 135–145)
SPECIMEN SOURCE: SIGNIFICANT CHANGE UP
TRIGL SERPL-MCNC: 140 MG/DL — SIGNIFICANT CHANGE UP
WBC # BLD: 10.26 K/UL — SIGNIFICANT CHANGE UP (ref 3.8–10.5)
WBC # FLD AUTO: 10.26 K/UL — SIGNIFICANT CHANGE UP (ref 3.8–10.5)

## 2022-08-10 PROCEDURE — 99232 SBSQ HOSP IP/OBS MODERATE 35: CPT

## 2022-08-10 RX ORDER — POTASSIUM CHLORIDE 20 MEQ
40 PACKET (EA) ORAL ONCE
Refills: 0 | Status: COMPLETED | OUTPATIENT
Start: 2022-08-10 | End: 2022-08-10

## 2022-08-10 RX ORDER — ACETAMINOPHEN 500 MG
975 TABLET ORAL ONCE
Refills: 0 | Status: COMPLETED | OUTPATIENT
Start: 2022-08-10 | End: 2022-08-10

## 2022-08-10 RX ADMIN — Medication 40 MILLIEQUIVALENT(S): at 21:07

## 2022-08-10 RX ADMIN — Medication 2: at 13:13

## 2022-08-10 RX ADMIN — Medication 975 MILLIGRAM(S): at 01:55

## 2022-08-10 RX ADMIN — INSULIN GLARGINE 15 UNIT(S): 100 INJECTION, SOLUTION SUBCUTANEOUS at 22:40

## 2022-08-10 RX ADMIN — HEPARIN SODIUM 5000 UNIT(S): 5000 INJECTION INTRAVENOUS; SUBCUTANEOUS at 13:14

## 2022-08-10 RX ADMIN — HEPARIN SODIUM 5000 UNIT(S): 5000 INJECTION INTRAVENOUS; SUBCUTANEOUS at 21:06

## 2022-08-10 RX ADMIN — Medication 5 UNIT(S): at 13:14

## 2022-08-10 RX ADMIN — Medication 5 UNIT(S): at 09:09

## 2022-08-10 RX ADMIN — Medication 5 UNIT(S): at 17:29

## 2022-08-10 RX ADMIN — CEFTRIAXONE 100 MILLIGRAM(S): 500 INJECTION, POWDER, FOR SOLUTION INTRAMUSCULAR; INTRAVENOUS at 17:29

## 2022-08-10 RX ADMIN — HEPARIN SODIUM 5000 UNIT(S): 5000 INJECTION INTRAVENOUS; SUBCUTANEOUS at 05:23

## 2022-08-10 RX ADMIN — Medication 1: at 09:09

## 2022-08-10 RX ADMIN — CHLORHEXIDINE GLUCONATE 1 APPLICATION(S): 213 SOLUTION TOPICAL at 13:13

## 2022-08-10 NOTE — PROGRESS NOTE ADULT - ASSESSMENT
68 yo M with no known pmhx, does not regualrly see doctors, presents to the ED for generalized weakness/fatigue, decreased PO intake, polydipsia.    # Sepsis   # UTI  Hypothermic/ Hyperthermic   Leukocytosis   Positive UA   Blood cx Urine cx - 8/8 Bcx with E. coli (no ESBL gene detected on PCR) and CoNS    COVID neg   Continue with Ceftriaxone   ID consult appreciated         # Hyperglycemia A1C   Start Lantus HISS   Endocrine consult     # PAO dehydration   iv Fluids     # HTN Start patient on low dose Norvasc

## 2022-08-10 NOTE — PROGRESS NOTE ADULT - ASSESSMENT
68 yo M unknown PMH, has not been to doctor in many years, presenting with weakness, fatigue, decreased po intake, polydipsia/polyuria, and intermittent fever/chills, found to have +UA and Bcx growing E. coli, with CT showing evidence of pyelonephritis.    #E. coli bacteremia, Pyelonephritis, Leukocytosis, Fever, Positive Culture Finding (Coag Neg Staph on BCx)  - pt with polyuria x 3 days  - UA 37 RBC, 210 WBC, many bacteria, large LE, nitrite +  - 8/8 CT A/P: bladder wall thickening and edematous L kidney consistent with ascending UTI   - has not been to doctor in many years, found to have A1C 11.4, likely contributing to his polydipsia but also increasing risk of ascending urinary infection  - 8/8 Bcx with E. coli (no ESBL gene detected on PCR) and CoNS  - now with downtrending leukocytosis (12 --> 10) and improving symptoms   - c/w ceftriaxone 1g qd to complete 10 day course (end date 8/17)  - will f/u sensitivities to determine po abx on discharge  - CoNS likely contaminant, f/u reculture from today to confirm    Blas Da Silva MD  PGY1

## 2022-08-10 NOTE — PROGRESS NOTE ADULT - SUBJECTIVE AND OBJECTIVE BOX
HPI:  Patient is a 67 M with no known PMH, does not regularly see doctors here for generalized weakness and fatigue, decreased PO intake, polydipsia admitted for weakness. Patient found to have gram negative rods bacteremia now on Zosyn. Endocrinology consulted for newly diagnosed diabetes.        Home diabetes medications:   - None    Inpatient diabetes medications:   - Lantus 15 QHS  - Lispro 5 TIDAC     Most recent HbA1C 11.4      INTERVAL HPI/OVERNIGHT EVENTS:  Still having polydipsia, polyuria but improved. Denies visual changes, numbness in feet. Tolerating diet well, no nausea or vomiting. POCT glucose now improved 138-200.       Review of systems:   CONSTITUTIONAL:  Feels well, good appetite  CARDIOVASCULAR:  Negative for chest pain or palpitations  RESPIRATORY:  Negative for cough, or SOB   GASTROINTESTINAL:  Negative for nausea, vomiting, or abdominal pain  GENITOURINARY:  Negative frequency, urgency or dysuria       CAPILLARY BLOOD GLUCOSE      POCT Blood Glucose.: 167 mg/dL (10 Aug 2022 08:25)  POCT Blood Glucose.: 138 mg/dL (09 Aug 2022 21:44)  POCT Blood Glucose.: 212 mg/dL (09 Aug 2022 17:00)  POCT Blood Glucose.: 265 mg/dL (09 Aug 2022 11:57)       MEDICATIONS  (STANDING):  cefTRIAXone   IVPB 1000 milliGRAM(s) IV Intermittent every 24 hours  chlorhexidine 4% Liquid 1 Application(s) Topical daily  dextrose 5%. 1000 milliLiter(s) (50 mL/Hr) IV Continuous <Continuous>  dextrose 5%. 1000 milliLiter(s) (100 mL/Hr) IV Continuous <Continuous>  dextrose 50% Injectable 25 Gram(s) IV Push once  dextrose 50% Injectable 12.5 Gram(s) IV Push once  dextrose 50% Injectable 25 Gram(s) IV Push once  glucagon  Injectable 1 milliGRAM(s) IntraMuscular once  heparin   Injectable 5000 Unit(s) SubCutaneous every 8 hours  insulin glargine Injectable (LANTUS) 15 Unit(s) SubCutaneous at bedtime  insulin lispro (ADMELOG) corrective regimen sliding scale   SubCutaneous three times a day before meals  insulin lispro (ADMELOG) corrective regimen sliding scale   SubCutaneous at bedtime  insulin lispro Injectable (ADMELOG) 5 Unit(s) SubCutaneous three times a day before meals  lactated ringers. 1000 milliLiter(s) (75 mL/Hr) IV Continuous <Continuous>    MEDICATIONS  (PRN):  dextrose Oral Gel 15 Gram(s) Oral once PRN Blood Glucose LESS THAN 70 milliGRAM(s)/deciliter      PHYSICAL EXAM  Vital Signs Last 24 Hrs  T(C): 38.1 (10 Aug 2022 00:57), Max: 38.1 (10 Aug 2022 00:57)  T(F): 100.5 (10 Aug 2022 00:57), Max: 100.5 (10 Aug 2022 00:57)  HR: 85 (10 Aug 2022 00:57) (79 - 93)  BP: 138/62 (10 Aug 2022 00:57) (126/73 - 138/62)  BP(mean): --  RR: 18 (10 Aug 2022 00:) (18 - 18)  SpO2: 93% (10 Aug 2022 00:57) (93% - 99%)    Parameters below as of 10 Aug 2022 00:57  Patient On (Oxygen Delivery Method): room air        GENERAL: Male laying in bed in NAD  Abdomen: Soft, nontender, non distended  Extremities: Warm, no edema in all 4 exts   NEURO: A&O X3    LABS:                        10.7   10. )-----------( 180      ( 10 Aug 2022 07:31 )             33.2     08-09    129<L>  |  93<L>  |  34<H>  ----------------------------<  287<H>  4.0   |  26  |  1.48<H>    Ca    8.8      09 Aug 2022 06:20  Mg     2.1     08-    TPro  7.0  /  Alb  2.9<L>  /  TBili  0.9  /  DBili  x   /  AST  13  /  ALT  13  /  AlkPhos  116  08-08    PT/INR - ( 08 Aug 2022 15:20 )   PT: 15.7 sec;   INR: 1.36 ratio         PTT - ( 08 Aug 2022 15:20 )  PTT:18.3 sec  Urinalysis Basic - ( 08 Aug 2022 16:19 )    Color: Yellow / Appearance: Slightly Turbid / S.017 / pH: x  Gluc: x / Ketone: Negative  / Bili: Negative / Urobili: 2 mg/dL   Blood: x / Protein: 100 mg/dL / Nitrite: Positive   Leuk Esterase: Large / RBC: 37 /hpf /  /HPF   Sq Epi: x / Non Sq Epi: 0 /hpf / Bacteria: Many      Thyroid Stimulating Hormone, Serum: 0.81 uIU/mL ( @ 15:21)

## 2022-08-10 NOTE — PROGRESS NOTE ADULT - ATTENDING COMMENTS
Patient with new diagnosis of DM with E coli bacteremia secondary to Pyelonephritis  BCID PCR without detected ESBL targets  Continue Ceftriaxone  Coagulase negative staph on BCx likely contaminant  Repeat blood cultures send and pending    I will continue to follow. Please feel free to contact me with any further questions.    Chicho Madrid M.D.  [facility] Division of Infectious Disease  8AM-5PM Monday - Friday: Available on [employer] Teams  After Hours and Holidays (or if no response on [employer] Teams): Please contact the Infectious Diseases Office at [phone]    The above assessment and plan were discussed with medicine NP

## 2022-08-10 NOTE — PROGRESS NOTE ADULT - SUBJECTIVE AND OBJECTIVE BOX
Patient is a 67y old  Male who presents with a chief complaint of Weakness x 4 days (09 Aug 2022 14:38)      SUBJECTIVE / OVERNIGHT EVENTS: patient spoking fevers      T(C): 37.6 (08-10-22 @ 20:10), Max: 38.5 (08-10-22 @ 19:50)  HR: 98 (08-10-22 @ 19:50) (98 - 98)  BP: 166/77 (08-10-22 @ 19:50) (166/77 - 166/77)  RR: 18 (08-10-22 @ 19:50) (18 - 18)  SpO2: 98% (08-10-22 @ 19:50) (98% - 98%)    MEDICATIONS  (STANDING):  cefTRIAXone   IVPB 1000 milliGRAM(s) IV Intermittent every 24 hours  chlorhexidine 4% Liquid 1 Application(s) Topical daily  dextrose 5%. 1000 milliLiter(s) (50 mL/Hr) IV Continuous <Continuous>  dextrose 5%. 1000 milliLiter(s) (100 mL/Hr) IV Continuous <Continuous>  dextrose 50% Injectable 25 Gram(s) IV Push once  dextrose 50% Injectable 12.5 Gram(s) IV Push once  dextrose 50% Injectable 25 Gram(s) IV Push once  glucagon  Injectable 1 milliGRAM(s) IntraMuscular once  heparin   Injectable 5000 Unit(s) SubCutaneous every 8 hours  insulin glargine Injectable (LANTUS) 15 Unit(s) SubCutaneous at bedtime  insulin lispro (ADMELOG) corrective regimen sliding scale   SubCutaneous three times a day before meals  insulin lispro (ADMELOG) corrective regimen sliding scale   SubCutaneous at bedtime  insulin lispro Injectable (ADMELOG) 5 Unit(s) SubCutaneous three times a day before meals  lactated ringers. 1000 milliLiter(s) (75 mL/Hr) IV Continuous <Continuous>    MEDICATIONS  (PRN):  dextrose Oral Gel 15 Gram(s) Oral once PRN Blood Glucose LESS THAN 70 milliGRAM(s)/deciliter      PHYSICAL EXAM:  GENERAL: NAD, well-developed  HEAD:  Atraumatic, Normocephalic  EYES: EOMI, PERRLA, conjunctiva and sclera clear  NECK: Supple, No JVD  CHEST/LUNG: Clear to auscultation bilaterally; No wheeze  HEART: Regular rate and rhythm; No murmurs, rubs, or gallops  ABDOMEN: Soft, Nontender, Nondistended; Bowel sounds present  EXTREMITIES:  2+ Peripheral Pulses, No clubbing, cyanosis, or edema  PSYCH: AAOx3  NEUROLOGY: non-focal  SKIN: No rashes or lesions                          10.7   10.26 )-----------( 180      ( 10 Aug 2022 07:31 )             33.2               128|94|25<227  3.3|24|1.14  8.2,--,--  08-10 @ 12:12  Imaging Personally Reviewed:    Consultant(s) Notes Reviewed:      Care Discussed with Consultants/Other Providers:

## 2022-08-10 NOTE — PROGRESS NOTE ADULT - SUBJECTIVE AND OBJECTIVE BOX
Follow Up:      Interval History/ROS:  Tmax 100.5F last night. Otherwise not events. Reports feeling better today, less abdominal pain. Urinating without issue.    Allergies  No Known Allergies        ANTIMICROBIALS:  cefTRIAXone   IVPB 1000 every 24 hours      OTHER MEDS:  MEDICATIONS  (STANDING):  dextrose 50% Injectable 25 once  dextrose 50% Injectable 12.5 once  dextrose 50% Injectable 25 once  dextrose Oral Gel 15 once PRN  glucagon  Injectable 1 once  heparin   Injectable 5000 every 8 hours  insulin glargine Injectable (LANTUS) 15 at bedtime  insulin lispro (ADMELOG) corrective regimen sliding scale  three times a day before meals  insulin lispro (ADMELOG) corrective regimen sliding scale  at bedtime  insulin lispro Injectable (ADMELOG) 5 three times a day before meals      Vital Signs Last 24 Hrs  T(C): 38.1 (10 Aug 2022 00:57), Max: 38.1 (10 Aug 2022 00:57)  T(F): 100.5 (10 Aug 2022 00:57), Max: 100.5 (10 Aug 2022 00:57)  HR: 85 (10 Aug 2022 00:57) (79 - 93)  BP: 138/62 (10 Aug 2022 00:57) (126/73 - 138/62)  BP(mean): --  RR: 18 (10 Aug 2022 00:57) (18 - 18)  SpO2: 93% (10 Aug 2022 00:57) (93% - 99%)    Parameters below as of 10 Aug 2022 00:57  Patient On (Oxygen Delivery Method): room air        PHYSICAL EXAM:  Constitutional: non-toxic, no distress  HEAD/EYES: anicteric, no conjunctival injection  ENT:  supple, no thrush  Cardiovascular:  systolic murmur best heard at RUSB, radiating to carotid  Respiratory:  clear BS bilaterally, no wheezes, no rales  GI:  soft, +TTP to epigastric and RLQ, improved from yesterday  :  no chaudhary, no CVA tenderness  Musculoskeletal:  no synovitis, normal ROM  Neurologic: awake and alert, normal strength, no focal findings  Skin:  no rash, no erythema, no phlebitis  Heme/Onc: no lymphadenopathy   Psychiatric:  awake, alert, appropriate mood                                10.7   10.26 )-----------( 180      ( 10 Aug 2022 07:31 )             33.2       08-09    129<L>  |  93<L>  |  34<H>  ----------------------------<  287<H>  4.0   |  26  |  1.48<H>    Ca    8.8      09 Aug 2022 06:20  Mg     2.1         TPro  7.0  /  Alb  2.9<L>  /  TBili  0.9  /  DBili  x   /  AST  13  /  ALT  13  /  AlkPhos  116        Urinalysis Basic - ( 08 Aug 2022 16:19 )    Color: Yellow / Appearance: Slightly Turbid / S.017 / pH: x  Gluc: x / Ketone: Negative  / Bili: Negative / Urobili: 2 mg/dL   Blood: x / Protein: 100 mg/dL / Nitrite: Positive   Leuk Esterase: Large / RBC: 37 /hpf /  /HPF   Sq Epi: x / Non Sq Epi: 0 /hpf / Bacteria: Many        MICROBIOLOGY:  v    Culture - Urine (collected 08 Aug 2022 16:19)  Source: Clean Catch Clean Catch (Midstream)  Preliminary Report (09 Aug 2022 15:55):    >100,000 CFU/ml Escherichia coli    Culture - Blood (collected 08 Aug 2022 14:45)  Source: .Blood Blood-Peripheral  Gram Stain (09 Aug 2022 06:34):    Growth in aerobic and anaerobic bottles: Gram Negative Rods  Preliminary Report (09 Aug 2022 06:35):    Growth in aerobic and anaerobic bottles: Gram Negative Rods    Culture - Blood (collected 08 Aug 2022 14:40)  Source: .Blood Blood-Peripheral  Gram Stain (09 Aug 2022 15:51):    Growth in aerobic bottle: Gram Negative Rods and Gram positive cocci in    pairs    Growth in anaerobic bottle: Gram Positive Cocci in Clusters  Preliminary Report (09 Aug 2022 15:52):    Growth in aerobic bottle: Gram Negative Rods and Gram positive cocci in    pairs    Growth in anaerobic bottle: Gram Positive Cocci in Clusters    ***Blood Panel PCR results on this specimen are available    approximately 3 hours after the Gram stain result.***    Gram stain, PCR, and/or culture results may not always    correspond due to difference in methodologies.    ************************************************************    This PCR assay was performed by multiplex PCR. This    Assay tests for 66 bacterial and resistance gene targets.    Please refer to the [facility] test directory    at [email or web address] for details.  Organism: Blood Culture PCR (09 Aug 2022 09:35)  Organism: Blood Culture PCR (09 Aug 2022 09:35)      -  Coagulase negative Staphylococcus: Detec      -  Escherichia coli: Detec      Method Type: PCR                    RADIOLOGY: Follow Up:  E coli Bacteremia    Interval History/ROS:  Tmax 100.5F last night. Otherwise not events. Reports feeling better today, less abdominal pain. Urinating without issue.    Allergies  No Known Allergies        ANTIMICROBIALS:  cefTRIAXone   IVPB 1000 every 24 hours      OTHER MEDS:  MEDICATIONS  (STANDING):  dextrose 50% Injectable 25 once  dextrose 50% Injectable 12.5 once  dextrose 50% Injectable 25 once  dextrose Oral Gel 15 once PRN  glucagon  Injectable 1 once  heparin   Injectable 5000 every 8 hours  insulin glargine Injectable (LANTUS) 15 at bedtime  insulin lispro (ADMELOG) corrective regimen sliding scale  three times a day before meals  insulin lispro (ADMELOG) corrective regimen sliding scale  at bedtime  insulin lispro Injectable (ADMELOG) 5 three times a day before meals      Vital Signs Last 24 Hrs  T(C): 38.1 (10 Aug 2022 00:57), Max: 38.1 (10 Aug 2022 00:57)  T(F): 100.5 (10 Aug 2022 00:57), Max: 100.5 (10 Aug 2022 00:57)  HR: 85 (10 Aug 2022 00:57) (79 - 93)  BP: 138/62 (10 Aug 2022 00:57) (126/73 - 138/62)  BP(mean): --  RR: 18 (10 Aug 2022 00:57) (18 - 18)  SpO2: 93% (10 Aug 2022 00:57) (93% - 99%)    Parameters below as of 10 Aug 2022 00:57  Patient On (Oxygen Delivery Method): room air        PHYSICAL EXAM:  Constitutional: non-toxic, no distress  HEAD/EYES: anicteric, no conjunctival injection  ENT:  supple, no thrush  Cardiovascular:  systolic murmur best heard at RUSB, radiating to carotid  Respiratory:  clear BS bilaterally, no wheezes, no rales  GI:  soft, +TTP to epigastric and RLQ, improved from yesterday  :  no chaudhary, no CVA tenderness  Musculoskeletal:  no synovitis, normal ROM  Neurologic: awake and alert, normal strength, no focal findings  Skin:  no rash, no erythema, no phlebitis  Heme/Onc: no lymphadenopathy   Psychiatric:  awake, alert, appropriate mood                                10.7   10.26 )-----------( 180      ( 10 Aug 2022 07:31 )             33.2       08-09    129<L>  |  93<L>  |  34<H>  ----------------------------<  287<H>  4.0   |  26  |  1.48<H>    Ca    8.8      09 Aug 2022 06:20  Mg     2.1         TPro  7.0  /  Alb  2.9<L>  /  TBili  0.9  /  DBili  x   /  AST  13  /  ALT  13  /  AlkPhos  116        Urinalysis Basic - ( 08 Aug 2022 16:19 )    Color: Yellow / Appearance: Slightly Turbid / S.017 / pH: x  Gluc: x / Ketone: Negative  / Bili: Negative / Urobili: 2 mg/dL   Blood: x / Protein: 100 mg/dL / Nitrite: Positive   Leuk Esterase: Large / RBC: 37 /hpf /  /HPF   Sq Epi: x / Non Sq Epi: 0 /hpf / Bacteria: Many        MICROBIOLOGY:  v    Culture - Urine (collected 08 Aug 2022 16:19)  Source: Clean Catch Clean Catch (Midstream)  Preliminary Report (09 Aug 2022 15:55):    >100,000 CFU/ml Escherichia coli    Culture - Blood (collected 08 Aug 2022 14:45)  Source: .Blood Blood-Peripheral  Gram Stain (09 Aug 2022 06:34):    Growth in aerobic and anaerobic bottles: Gram Negative Rods  Preliminary Report (09 Aug 2022 06:35):    Growth in aerobic and anaerobic bottles: Gram Negative Rods    Culture - Blood (collected 08 Aug 2022 14:40)  Source: .Blood Blood-Peripheral  Gram Stain (09 Aug 2022 15:51):    Growth in aerobic bottle: Gram Negative Rods and Gram positive cocci in    pairs    Growth in anaerobic bottle: Gram Positive Cocci in Clusters  Preliminary Report (09 Aug 2022 15:52):    Growth in aerobic bottle: Gram Negative Rods and Gram positive cocci in    pairs    Growth in anaerobic bottle: Gram Positive Cocci in Clusters    ***Blood Panel PCR results on this specimen are available    approximately 3 hours after the Gram stain result.***    Gram stain, PCR, and/or culture results may not always    correspond due to difference in methodologies.    ************************************************************    This PCR assay was performed by multiplex PCR. This    Assay tests for 66 bacterial and resistance gene targets.    Please refer to the [facility] Labs test directory    at [email or web address] for details.  Organism: Blood Culture PCR (09 Aug 2022 09:35)  Organism: Blood Culture PCR (09 Aug 2022 09:35)      -  Coagulase negative Staphylococcus: Detec      -  Escherichia coli: Detec      Method Type: PCR      RADIOLOGY:    <The imaging below has been reviewed and visualized by me independently. Findings as detailed in report below>    < from: CT Abdomen and Pelvis No Cont (22 @ 19:12) >  IMPRESSION:  1.  No hydronephrosis or ureteral stone. Mild enlargement of the left   kidney with asymmetric perinephric stranding; pyelonephritis is a   consideration. No evidence of an abscess.  2.  Mildly enlarged left para-aortic lymph nodes, possibly reactive.  3.  Questionable gastric wall-thickening may reflect gastritis.    < end of copied text >

## 2022-08-11 LAB
-  AMIKACIN: SIGNIFICANT CHANGE UP
-  AMPICILLIN/SULBACTAM: SIGNIFICANT CHANGE UP
-  AMPICILLIN: SIGNIFICANT CHANGE UP
-  AZTREONAM: SIGNIFICANT CHANGE UP
-  CEFAZOLIN: SIGNIFICANT CHANGE UP
-  CEFEPIME: SIGNIFICANT CHANGE UP
-  CEFOXITIN: SIGNIFICANT CHANGE UP
-  CEFTRIAXONE: SIGNIFICANT CHANGE UP
-  CIPROFLOXACIN: SIGNIFICANT CHANGE UP
-  ERTAPENEM: SIGNIFICANT CHANGE UP
-  GENTAMICIN: SIGNIFICANT CHANGE UP
-  IMIPENEM: SIGNIFICANT CHANGE UP
-  LEVOFLOXACIN: SIGNIFICANT CHANGE UP
-  MEROPENEM: SIGNIFICANT CHANGE UP
-  PIPERACILLIN/TAZOBACTAM: SIGNIFICANT CHANGE UP
-  TOBRAMYCIN: SIGNIFICANT CHANGE UP
-  TRIMETHOPRIM/SULFAMETHOXAZOLE: SIGNIFICANT CHANGE UP
ANION GAP SERPL CALC-SCNC: 11 MMOL/L — SIGNIFICANT CHANGE UP (ref 5–17)
BUN SERPL-MCNC: 20 MG/DL — SIGNIFICANT CHANGE UP (ref 7–23)
CALCIUM SERPL-MCNC: 8.6 MG/DL — SIGNIFICANT CHANGE UP (ref 8.4–10.5)
CHLORIDE SERPL-SCNC: 96 MMOL/L — SIGNIFICANT CHANGE UP (ref 96–108)
CO2 SERPL-SCNC: 24 MMOL/L — SIGNIFICANT CHANGE UP (ref 22–31)
CREAT SERPL-MCNC: 1.26 MG/DL — SIGNIFICANT CHANGE UP (ref 0.5–1.3)
CULTURE RESULTS: SIGNIFICANT CHANGE UP
EGFR: 63 ML/MIN/1.73M2 — SIGNIFICANT CHANGE UP
GLUCOSE BLDC GLUCOMTR-MCNC: 117 MG/DL — HIGH (ref 70–99)
GLUCOSE BLDC GLUCOMTR-MCNC: 121 MG/DL — HIGH (ref 70–99)
GLUCOSE BLDC GLUCOMTR-MCNC: 137 MG/DL — HIGH (ref 70–99)
GLUCOSE BLDC GLUCOMTR-MCNC: 143 MG/DL — HIGH (ref 70–99)
GLUCOSE BLDC GLUCOMTR-MCNC: 208 MG/DL — HIGH (ref 70–99)
GLUCOSE SERPL-MCNC: 125 MG/DL — HIGH (ref 70–99)
HCT VFR BLD CALC: 30.4 % — LOW (ref 39–50)
HGB BLD-MCNC: 9.9 G/DL — LOW (ref 13–17)
MAGNESIUM SERPL-MCNC: 2 MG/DL — SIGNIFICANT CHANGE UP (ref 1.6–2.6)
MCHC RBC-ENTMCNC: 22.4 PG — LOW (ref 27–34)
MCHC RBC-ENTMCNC: 32.6 GM/DL — SIGNIFICANT CHANGE UP (ref 32–36)
MCV RBC AUTO: 68.8 FL — LOW (ref 80–100)
METHOD TYPE: SIGNIFICANT CHANGE UP
NRBC # BLD: 0 /100 WBCS — SIGNIFICANT CHANGE UP (ref 0–0)
PLATELET # BLD AUTO: 246 K/UL — SIGNIFICANT CHANGE UP (ref 150–400)
POTASSIUM SERPL-MCNC: 3.2 MMOL/L — LOW (ref 3.5–5.3)
POTASSIUM SERPL-SCNC: 3.2 MMOL/L — LOW (ref 3.5–5.3)
RBC # BLD: 4.42 M/UL — SIGNIFICANT CHANGE UP (ref 4.2–5.8)
RBC # FLD: 14.6 % — HIGH (ref 10.3–14.5)
SODIUM SERPL-SCNC: 131 MMOL/L — LOW (ref 135–145)
SPECIMEN SOURCE: SIGNIFICANT CHANGE UP
WBC # BLD: 9.7 K/UL — SIGNIFICANT CHANGE UP (ref 3.8–10.5)
WBC # FLD AUTO: 9.7 K/UL — SIGNIFICANT CHANGE UP (ref 3.8–10.5)

## 2022-08-11 PROCEDURE — 99232 SBSQ HOSP IP/OBS MODERATE 35: CPT

## 2022-08-11 RX ORDER — POTASSIUM CHLORIDE 20 MEQ
40 PACKET (EA) ORAL ONCE
Refills: 0 | Status: COMPLETED | OUTPATIENT
Start: 2022-08-11 | End: 2022-08-11

## 2022-08-11 RX ADMIN — Medication 5 UNIT(S): at 17:49

## 2022-08-11 RX ADMIN — CEFTRIAXONE 100 MILLIGRAM(S): 500 INJECTION, POWDER, FOR SOLUTION INTRAMUSCULAR; INTRAVENOUS at 17:50

## 2022-08-11 RX ADMIN — HEPARIN SODIUM 5000 UNIT(S): 5000 INJECTION INTRAVENOUS; SUBCUTANEOUS at 06:01

## 2022-08-11 RX ADMIN — INSULIN GLARGINE 15 UNIT(S): 100 INJECTION, SOLUTION SUBCUTANEOUS at 22:27

## 2022-08-11 RX ADMIN — HEPARIN SODIUM 5000 UNIT(S): 5000 INJECTION INTRAVENOUS; SUBCUTANEOUS at 21:19

## 2022-08-11 RX ADMIN — HEPARIN SODIUM 5000 UNIT(S): 5000 INJECTION INTRAVENOUS; SUBCUTANEOUS at 13:30

## 2022-08-11 RX ADMIN — Medication 40 MILLIEQUIVALENT(S): at 17:50

## 2022-08-11 RX ADMIN — CHLORHEXIDINE GLUCONATE 1 APPLICATION(S): 213 SOLUTION TOPICAL at 13:32

## 2022-08-11 RX ADMIN — Medication 5 UNIT(S): at 13:30

## 2022-08-11 RX ADMIN — Medication 5 UNIT(S): at 08:34

## 2022-08-11 NOTE — PROGRESS NOTE ADULT - SUBJECTIVE AND OBJECTIVE BOX
Patient is a 67y old  Male who presents with a chief complaint of Weakness x 4 days (09 Aug 2022 14:38)      SUBJECTIVE / OVERNIGHT EVENTS: patient spoking fevers    T(C): 37.2 (08-11-22 @ 20:34), Max: 37.3 (08-11-22 @ 12:21)  HR: 89 (08-11-22 @ 20:34) (70 - 89)  BP: 127/71 (08-11-22 @ 20:34) (119/65 - 128/75)  RR: 18 (08-11-22 @ 20:34) (18 - 18)  SpO2: 99% (08-11-22 @ 20:34) (99% - 100%)    MEDICATIONS  (STANDING):  cefTRIAXone   IVPB 1000 milliGRAM(s) IV Intermittent every 24 hours  chlorhexidine 4% Liquid 1 Application(s) Topical daily  dextrose 5%. 1000 milliLiter(s) (50 mL/Hr) IV Continuous <Continuous>  dextrose 5%. 1000 milliLiter(s) (100 mL/Hr) IV Continuous <Continuous>  dextrose 50% Injectable 25 Gram(s) IV Push once  dextrose 50% Injectable 12.5 Gram(s) IV Push once  dextrose 50% Injectable 25 Gram(s) IV Push once  glucagon  Injectable 1 milliGRAM(s) IntraMuscular once  heparin   Injectable 5000 Unit(s) SubCutaneous every 8 hours  insulin glargine Injectable (LANTUS) 15 Unit(s) SubCutaneous at bedtime  insulin lispro (ADMELOG) corrective regimen sliding scale   SubCutaneous three times a day before meals  insulin lispro (ADMELOG) corrective regimen sliding scale   SubCutaneous at bedtime  insulin lispro Injectable (ADMELOG) 5 Unit(s) SubCutaneous three times a day before meals  lactated ringers. 1000 milliLiter(s) (75 mL/Hr) IV Continuous <Continuous>    MEDICATIONS  (PRN):  dextrose Oral Gel 15 Gram(s) Oral once PRN Blood Glucose LESS THAN 70 milliGRAM(s)/deciliter    PHYSICAL EXAM:  GENERAL: NAD, well-developed  HEAD:  Atraumatic, Normocephalic  EYES: EOMI, conjunctiva and sclera clear  NECK: Supple, No JVD  CHEST/LUNG: Clear to auscultation bilaterally; No wheeze  HEART: Regular rate and rhythm; No murmurs, rubs, or gallops  ABDOMEN: Soft, Nontender, Nondistended; Bowel sounds present  EXTREMITIES:  2+ Peripheral Pulses, No clubbing, cyanosis, or edema  PSYCH: AAOx3  NEUROLOGY: non-focal  SKIN: No rashes or lesions                                    9.9    9.70  )-----------( 246      ( 11 Aug 2022 07:22 )             30.4               131|96|20<125  3.2|24|1.26  8.6,2.0,--  08-11 @ 07:21      CAPILLARY BLOOD GLUCOSE      POCT Blood Glucose.: 143 mg/dL (11 Aug 2022 22:01)  POCT Blood Glucose.: 121 mg/dL (11 Aug 2022 17:08)  POCT Blood Glucose.: 208 mg/dL (11 Aug 2022 13:29)  POCT Blood Glucose.: 117 mg/dL (11 Aug 2022 11:34)  POCT Blood Glucose.: 137 mg/dL (11 Aug 2022 08:14)  POCT Blood Glucose.: 195 mg/dL (10 Aug 2022 22:19)                      Imaging Personally Reviewed:    Consultant(s) Notes Reviewed:      Care Discussed with Consultants/Other Providers:

## 2022-08-11 NOTE — PROGRESS NOTE ADULT - ASSESSMENT
66 yo M with no known pmhx, does not regualrly see doctors, presents to the ED for generalized weakness/fatigue, decreased PO intake, polydipsia.    # Sepsis ONGOING FEVERS   If continued fevers over next 24-48H would check renal US to exclude development of renal/perirenal abscess  # UTI  Hypothermic/ Hyperthermic   Leukocytosis   Positive UA   Blood cx Urine cx - 8/8 Bcx with E. coli (no ESBL gene detected on PCR) and CoNS    COVID neg   Continue with Ceftriaxone   ID consult appreciated         # Hyperglycemia A1C   Start Lantus HISS   Endocrine consult     # PAO dehydration   iv Fluids     # HTN Start patient on low dose Norvasc

## 2022-08-11 NOTE — PROGRESS NOTE ADULT - ASSESSMENT
66 yo M unknown PMH, has not been to doctor in many years, presenting with weakness, fatigue, decreased po intake, polydipsia/polyuria, and intermittent fever/chills, found to have +UA and Bcx growing E. coli, with CT showing evidence of pyelonephritis.    #E. coli bacteremia, Pyelonephritis, Leukocytosis, Fever  UA 37 RBC, 210 WBC, many bacteria, large LE, nitrite +  8/8 CT A/P: bladder wall thickening and edematous L kidney consistent with ascending UTI   8/8 BCx with E. coli  --If continued fevers over next 24-48H would check renal US to exclude development of renal/perirenal abscess  --Continue Ceftriaxone 1g IV Q24H  --Continue to follow CBC with diff  --Continue to follow temperature curve  --Follow up on preliminary blood cultures    #Diarrhea  --If continued diarrhea would check C diff Toxin Assay    #Positive Culture Finding (Coag Neg Staph on BCx)  Likely contaminant  --Follow up on prelim blood cultures     I will continue to follow. Please feel free to contact me with any further questions.    Chicho Madrid M.D.  [facility] Division of Infectious Disease  8AM-5PM Monday - Friday: Available on [employer] Teams  After Hours and Holidays (or if no response on [employer] Teams): Please contact the Infectious Diseases Office at [phone]

## 2022-08-11 NOTE — PROGRESS NOTE ADULT - SUBJECTIVE AND OBJECTIVE BOX
Contact info:   Isac Richards MD  pager [phone], please provide 10 digit call back number.   You may also contact me on [employer] Teams during business hours today.   Please note that this patient may be followed by another provider tomorrow.   If no answer or after hours, please contact [phone]    History:    Interval History/Subjective:    MEDICATIONS  (STANDING):  cefTRIAXone   IVPB 1000 milliGRAM(s) IV Intermittent every 24 hours  chlorhexidine 4% Liquid 1 Application(s) Topical daily  dextrose 5%. 1000 milliLiter(s) (50 mL/Hr) IV Continuous <Continuous>  dextrose 5%. 1000 milliLiter(s) (100 mL/Hr) IV Continuous <Continuous>  dextrose 50% Injectable 25 Gram(s) IV Push once  dextrose 50% Injectable 12.5 Gram(s) IV Push once  dextrose 50% Injectable 25 Gram(s) IV Push once  glucagon  Injectable 1 milliGRAM(s) IntraMuscular once  heparin   Injectable 5000 Unit(s) SubCutaneous every 8 hours  insulin glargine Injectable (LANTUS) 15 Unit(s) SubCutaneous at bedtime  insulin lispro (ADMELOG) corrective regimen sliding scale   SubCutaneous three times a day before meals  insulin lispro (ADMELOG) corrective regimen sliding scale   SubCutaneous at bedtime  insulin lispro Injectable (ADMELOG) 5 Unit(s) SubCutaneous three times a day before meals  lactated ringers. 1000 milliLiter(s) (75 mL/Hr) IV Continuous <Continuous>    MEDICATIONS  (PRN):  dextrose Oral Gel 15 Gram(s) Oral once PRN Blood Glucose LESS THAN 70 milliGRAM(s)/deciliter      Allergies    No Known Allergies    Intolerances      Review of Systems:  Constitutional: No fever  Eyes: No blurry vision  Neuro: No tremors  HEENT: No pain  Cardiovascular: No chest pain, palpitations  Respiratory: No SOB, no cough  GI: No nausea, vomiting, abdominal pain  : No dysuria  Skin: no rash  Psych: no depression  Endocrine: no polyuria, polydipsia  Hem/lymph: no swelling    ALL OTHER SYSTEMS REVIEWED AND NEGATIVE    PHYSICAL EXAM:  VITALS: T(C): 37 (08-11-22 @ 08:55)  T(F): 98.6 (08-11-22 @ 08:55), Max: 101.3 (08-10-22 @ 19:50)  HR: 70 (08-11-22 @ 08:55) (70 - 98)  BP: 157/75 (08-11-22 @ 08:55) (154/71 - 166/77)  RR:  (18 - 18)  SpO2:  (97% - 99%)  Wt(kg): --  GENERAL: NAD, well-groomed, well-developed  EYES: No proptosis, no lid lag, anicteric  HEENT:  Atraumatic, Normocephalic, moist mucous membranes  THYROID: Normal size, no palpable nodules  RESPIRATORY: Clear to auscultation bilaterally; No rales, rhonchi, wheezing, or rubs  CARDIOVASCULAR: Regular rate and rhythm; No murmurs; no peripheral edema  GI: Soft, nontender, non distended, normal bowel sounds  SKIN: Dry, intact, No rashes or lesions  MUSCULOSKELETAL: Full range of motion, normal strength  NEURO: sensation intact, extraocular movements intact, no tremor, normal reflexes  PSYCH: Alert and oriented x 3, normal affect, normal mood  CUSHING'S SIGNS: no striae    POCT Blood Glucose.: 117 mg/dL (08-11-22 @ 11:34)  POCT Blood Glucose.: 137 mg/dL (08-11-22 @ 08:14)  POCT Blood Glucose.: 195 mg/dL (08-10-22 @ 22:19)  POCT Blood Glucose.: 135 mg/dL (08-10-22 @ 17:20)  POCT Blood Glucose.: 235 mg/dL (08-10-22 @ 12:24)  POCT Blood Glucose.: 167 mg/dL (08-10-22 @ 08:25)  POCT Blood Glucose.: 138 mg/dL (08-09-22 @ 21:44)  POCT Blood Glucose.: 212 mg/dL (08-09-22 @ 17:00)  POCT Blood Glucose.: 265 mg/dL (08-09-22 @ 11:57)  POCT Blood Glucose.: 283 mg/dL (08-09-22 @ 08:26)  POCT Blood Glucose.: 277 mg/dL (08-09-22 @ 04:30)  POCT Blood Glucose.: 295 mg/dL (08-08-22 @ 22:30)  POCT Blood Glucose.: 357 mg/dL (08-08-22 @ 14:16)      08-11    131<L>  |  96  |  20  ----------------------------<  125<H>  3.2<L>   |  24  |  1.26    eGFR: 63    Ca    8.6      08-11  Mg     2.0     08-11    TPro  7.0  /  Alb  2.9<L>  /  TBili  0.9  /  DBili  x   /  AST  13  /  ALT  13  /  AlkPhos  116  08-08        Thyroid Function Tests:  08-08 @ 15:21 TSH 0.81 FreeT4 -- T3 -- Anti TPO -- Anti Thyroglobulin Ab -- TSI --

## 2022-08-11 NOTE — PROGRESS NOTE ADULT - SUBJECTIVE AND OBJECTIVE BOX
Follow Up:      Interval History:    REVIEW OF SYSTEMS  [  ] ROS unobtainable because:    [  ] All other systems negative except as noted below    Constitutional:  [ ] fever [ ] chills  [ ] weight loss  [ ] weakness  Skin:  [ ] rash [ ] phlebitis	  Eyes: [ ] icterus [ ] pain  [ ] discharge	  ENMT: [ ] sore throat  [ ] thrush [ ] ulcers [ ] exudates  Respiratory: [ ] dyspnea [ ] hemoptysis [ ] cough [ ] sputum	  Cardiovascular:  [ ] chest pain [ ] palpitations [ ] edema	  Gastrointestinal:  [ ] nausea [ ] vomiting [ ] diarrhea [ ] constipation [ ] pain	  Genitourinary:  [ ] dysuria [ ] frequency [ ] hematuria [ ] discharge [ ] flank pain  [ ] incontinence  Musculoskeletal:  [ ] myalgias [ ] arthralgias [ ] arthritis  [ ] back pain  Neurological:  [ ] headache [ ] seizures  [ ] confusion/altered mental status    Allergies  No Known Allergies        ANTIMICROBIALS:  cefTRIAXone   IVPB 1000 every 24 hours      OTHER MEDS:  MEDICATIONS  (STANDING):  dextrose 50% Injectable 25 once  dextrose 50% Injectable 12.5 once  dextrose 50% Injectable 25 once  dextrose Oral Gel 15 once PRN  glucagon  Injectable 1 once  heparin   Injectable 5000 every 8 hours  insulin glargine Injectable (LANTUS) 15 at bedtime  insulin lispro (ADMELOG) corrective regimen sliding scale  three times a day before meals  insulin lispro (ADMELOG) corrective regimen sliding scale  at bedtime  insulin lispro Injectable (ADMELOG) 5 three times a day before meals      Vital Signs Last 24 Hrs  T(C): 37.3 (11 Aug 2022 12:21), Max: 38.5 (10 Aug 2022 19:50)  T(F): 99.2 (11 Aug 2022 12:21), Max: 101.3 (10 Aug 2022 19:50)  HR: 70 (11 Aug 2022 12:21) (70 - 98)  BP: 119/65 (11 Aug 2022 12:21) (119/65 - 166/77)  BP(mean): --  RR: 18 (11 Aug 2022 12:21) (18 - 18)  SpO2: 100% (11 Aug 2022 12:21) (97% - 100%)    Parameters below as of 11 Aug 2022 12:21  Patient On (Oxygen Delivery Method): room air        PHYSICAL EXAMINATION:  General: Alert and Awake, NAD  HEENT: PERRL, EOMI  Neck: Supple  Cardiac: RRR, No M/R/G  Resp: CTAB, No Wh/Rh/Ra  Abdomen: NBS, NT/ND, No HSM, No rigidity or guarding  MSK: No LE edema. No Calf tenderness  : No chaudhary  Skin: No rashes or lesions. Skin is warm and dry to the touch.   Neuro: Alert and Awake. CN 2-12 Grossly intact. Moves all four extremities spontaneously.  Psych: Calm, Pleasant, Cooperative                          9.9    9.70  )-----------( 246      ( 11 Aug 2022 07:22 )             30.4       08-11    131<L>  |  96  |  20  ----------------------------<  125<H>  3.2<L>   |  24  |  1.26    Ca    8.6      11 Aug 2022 07:21  Mg     2.0     08-11            MICROBIOLOGY:  v  .Blood Blood-Peripheral  08-09-22   No growth to date.  --  --      Clean Catch Clean Catch (Midstream)  08-08-22   >100,000 CFU/ml Escherichia coli  --  Escherichia coli      .Blood Blood-Peripheral  08-08-22   Growth in aerobic and anaerobic bottles: Escherichia coli  See previous culture [id number]  --    Growth in aerobic and anaerobic bottles: Gram Negative Rods      .Blood Blood-Peripheral  08-08-22   Growth in aerobic bottle: Escherichia coli  Growth in anaerobic bottle: Staphylococcus hominis Coag Negative  Staphylococcus  Single set isolate, possible contaminant. Contact  Microbiology if susceptibility testing clinically  indicated.  Growth in aerobic bottle: Gram positive cocci in pairs  ***Blood Panel PCR results on this specimen are available  approximately 3 hours after the Gram stain result.***  Gram stain, PCR, and/or culture results may not always  correspond due to difference in methodologies.  ************************************************************  This PCR assay was performed by multiplex PCR. This  Assay tests for 66 bacterial and resistance gene targets.  Please refer to the [facility] test directory  at [email or web address] for details.  --  Blood Culture PCR  Escherichia coli                RADIOLOGY:    <The imaging below has been reviewed and visualized by me independently. Findings as detailed in report below> Follow Up:  E coli Bacteremia    Interval History: continued fevers overnight. noting diarrhea since yesterday evening.     REVIEW OF SYSTEMS  [  ] ROS unobtainable because:    [ x ] All other systems negative except as noted below    Constitutional:  [x ] fever [ ] chills  [ ] weight loss  [ ] weakness  Skin:  [ ] rash [ ] phlebitis	  Eyes: [ ] icterus [ ] pain  [ ] discharge	  ENMT: [ ] sore throat  [ ] thrush [ ] ulcers [ ] exudates  Respiratory: [ ] dyspnea [ ] hemoptysis [ ] cough [ ] sputum	  Cardiovascular:  [ ] chest pain [ ] palpitations [ ] edema	  Gastrointestinal:  [ ] nausea [ ] vomiting [x ] diarrhea [ ] constipation [ ] pain	  Genitourinary:  [ ] dysuria [ ] frequency [ ] hematuria [ ] discharge [ ] flank pain  [ ] incontinence  Musculoskeletal:  [ ] myalgias [ ] arthralgias [ ] arthritis  [ ] back pain  Neurological:  [ ] headache [ ] seizures  [ ] confusion/altered mental status    Allergies  No Known Allergies        ANTIMICROBIALS:  cefTRIAXone   IVPB 1000 every 24 hours      OTHER MEDS:  MEDICATIONS  (STANDING):  dextrose 50% Injectable 25 once  dextrose 50% Injectable 12.5 once  dextrose 50% Injectable 25 once  dextrose Oral Gel 15 once PRN  glucagon  Injectable 1 once  heparin   Injectable 5000 every 8 hours  insulin glargine Injectable (LANTUS) 15 at bedtime  insulin lispro (ADMELOG) corrective regimen sliding scale  three times a day before meals  insulin lispro (ADMELOG) corrective regimen sliding scale  at bedtime  insulin lispro Injectable (ADMELOG) 5 three times a day before meals      Vital Signs Last 24 Hrs  T(C): 37.3 (11 Aug 2022 12:21), Max: 38.5 (10 Aug 2022 19:50)  T(F): 99.2 (11 Aug 2022 12:21), Max: 101.3 (10 Aug 2022 19:50)  HR: 70 (11 Aug 2022 12:21) (70 - 98)  BP: 119/65 (11 Aug 2022 12:21) (119/65 - 166/77)  BP(mean): --  RR: 18 (11 Aug 2022 12:21) (18 - 18)  SpO2: 100% (11 Aug 2022 12:21) (97% - 100%)    Parameters below as of 11 Aug 2022 12:21  Patient On (Oxygen Delivery Method): room air        PHYSICAL EXAMINATION:  General: Alert and Awake, NAD  HEENT: PERRL, EOMI  Neck: Supple  Cardiac: RRR, No M/R/G  Resp: CTAB, No Wh/Rh/Ra  Abdomen: NBS, NT/ND, No HSM, No rigidity or guarding  MSK: No LE edema. No Calf tenderness  : Mild CVA tenderness over the left kidney  Skin: No rashes or lesions. Skin is warm and dry to the touch.   Neuro: Alert and Awake. CN 2-12 Grossly intact. Moves all four extremities spontaneously.  Psych: Calm, Pleasant, Cooperative                          9.9    9.70  )-----------( 246      ( 11 Aug 2022 07:22 )             30.4       08-11    131<L>  |  96  |  20  ----------------------------<  125<H>  3.2<L>   |  24  |  1.26    Ca    8.6      11 Aug 2022 07:21  Mg     2.0     08-11            MICROBIOLOGY:  v  .Blood Blood-Peripheral  08-09-22   No growth to date.  --  --      Clean Catch Clean Catch (Midstream)  08-08-22   >100,000 CFU/ml Escherichia coli  --  Escherichia coli      .Blood Blood-Peripheral  08-08-22   Growth in aerobic and anaerobic bottles: Escherichia coli  See previous culture [id number]  --    Growth in aerobic and anaerobic bottles: Gram Negative Rods      .Blood Blood-Peripheral  08-08-22   Growth in aerobic bottle: Escherichia coli  Growth in anaerobic bottle: Staphylococcus hominis Coag Negative  Staphylococcus  Single set isolate, possible contaminant. Contact  Microbiology if susceptibility testing clinically  indicated.  Growth in aerobic bottle: Gram positive cocci in pairs  ***Blood Panel PCR results on this specimen are available  approximately 3 hours after the Gram stain result.***  Gram stain, PCR, and/or culture results may not always  correspond due to difference in methodologies.  ************************************************************  This PCR assay was performed by multiplex PCR. This  Assay tests for 66 bacterial and resistance gene targets.  Please refer to the [facility] test directory  at [email or web address] for details.  --  Blood Culture PCR  Escherichia coli    RADIOLOGY:    <The imaging below has been reviewed and visualized by me independently. Findings as detailed in report below>    < from: CT Abdomen and Pelvis No Cont (08.08.22 @ 19:12) >  IMPRESSION:  1.  No hydronephrosis or ureteral stone. Mild enlargement of the left   kidney with asymmetric perinephric stranding; pyelonephritis is a   consideration. No evidence of an abscess.  2.  Mildly enlarged left para-aortic lymph nodes, possibly reactive.  3.  Questionable gastric wall-thickening may reflect gastritis.    < end of copied text >

## 2022-08-12 LAB
ANION GAP SERPL CALC-SCNC: 10 MMOL/L — SIGNIFICANT CHANGE UP (ref 5–17)
BUN SERPL-MCNC: 19 MG/DL — SIGNIFICANT CHANGE UP (ref 7–23)
CALCIUM SERPL-MCNC: 8.4 MG/DL — SIGNIFICANT CHANGE UP (ref 8.4–10.5)
CHLORIDE SERPL-SCNC: 99 MMOL/L — SIGNIFICANT CHANGE UP (ref 96–108)
CO2 SERPL-SCNC: 25 MMOL/L — SIGNIFICANT CHANGE UP (ref 22–31)
CREAT SERPL-MCNC: 1.3 MG/DL — SIGNIFICANT CHANGE UP (ref 0.5–1.3)
CULTURE RESULTS: SIGNIFICANT CHANGE UP
EGFR: 60 ML/MIN/1.73M2 — SIGNIFICANT CHANGE UP
GLUCOSE BLDC GLUCOMTR-MCNC: 108 MG/DL — HIGH (ref 70–99)
GLUCOSE BLDC GLUCOMTR-MCNC: 142 MG/DL — HIGH (ref 70–99)
GLUCOSE BLDC GLUCOMTR-MCNC: 152 MG/DL — HIGH (ref 70–99)
GLUCOSE BLDC GLUCOMTR-MCNC: 156 MG/DL — HIGH (ref 70–99)
GLUCOSE SERPL-MCNC: 125 MG/DL — HIGH (ref 70–99)
HCT VFR BLD CALC: 29.9 % — LOW (ref 39–50)
HGB BLD-MCNC: 9.8 G/DL — LOW (ref 13–17)
HIV 1+2 AB+HIV1 P24 AG SERPL QL IA: SIGNIFICANT CHANGE UP
MCHC RBC-ENTMCNC: 22.6 PG — LOW (ref 27–34)
MCHC RBC-ENTMCNC: 32.8 GM/DL — SIGNIFICANT CHANGE UP (ref 32–36)
MCV RBC AUTO: 69.1 FL — LOW (ref 80–100)
NRBC # BLD: 0 /100 WBCS — SIGNIFICANT CHANGE UP (ref 0–0)
ORGANISM # SPEC MICROSCOPIC CNT: SIGNIFICANT CHANGE UP
PLATELET # BLD AUTO: 361 K/UL — SIGNIFICANT CHANGE UP (ref 150–400)
POTASSIUM SERPL-MCNC: 3.5 MMOL/L — SIGNIFICANT CHANGE UP (ref 3.5–5.3)
POTASSIUM SERPL-SCNC: 3.5 MMOL/L — SIGNIFICANT CHANGE UP (ref 3.5–5.3)
RBC # BLD: 4.33 M/UL — SIGNIFICANT CHANGE UP (ref 4.2–5.8)
RBC # FLD: 14.9 % — HIGH (ref 10.3–14.5)
SODIUM SERPL-SCNC: 134 MMOL/L — LOW (ref 135–145)
SPECIMEN SOURCE: SIGNIFICANT CHANGE UP
WBC # BLD: 8.4 K/UL — SIGNIFICANT CHANGE UP (ref 3.8–10.5)
WBC # FLD AUTO: 8.4 K/UL — SIGNIFICANT CHANGE UP (ref 3.8–10.5)

## 2022-08-12 PROCEDURE — 99232 SBSQ HOSP IP/OBS MODERATE 35: CPT

## 2022-08-12 PROCEDURE — 76770 US EXAM ABDO BACK WALL COMP: CPT | Mod: 26

## 2022-08-12 RX ADMIN — Medication 5 UNIT(S): at 09:26

## 2022-08-12 RX ADMIN — HEPARIN SODIUM 5000 UNIT(S): 5000 INJECTION INTRAVENOUS; SUBCUTANEOUS at 20:57

## 2022-08-12 RX ADMIN — CEFTRIAXONE 100 MILLIGRAM(S): 500 INJECTION, POWDER, FOR SOLUTION INTRAMUSCULAR; INTRAVENOUS at 20:57

## 2022-08-12 RX ADMIN — Medication 5 UNIT(S): at 18:32

## 2022-08-12 RX ADMIN — CHLORHEXIDINE GLUCONATE 1 APPLICATION(S): 213 SOLUTION TOPICAL at 12:55

## 2022-08-12 RX ADMIN — Medication 1: at 09:24

## 2022-08-12 RX ADMIN — Medication 1: at 18:31

## 2022-08-12 RX ADMIN — HEPARIN SODIUM 5000 UNIT(S): 5000 INJECTION INTRAVENOUS; SUBCUTANEOUS at 12:58

## 2022-08-12 RX ADMIN — INSULIN GLARGINE 15 UNIT(S): 100 INJECTION, SOLUTION SUBCUTANEOUS at 22:29

## 2022-08-12 RX ADMIN — HEPARIN SODIUM 5000 UNIT(S): 5000 INJECTION INTRAVENOUS; SUBCUTANEOUS at 05:58

## 2022-08-12 NOTE — PROGRESS NOTE ADULT - ASSESSMENT
Patient is a 67 M with no known PMH, does not regularly see doctors here for generalized weakness and fatigue, decreased PO intake, polydipsia admitted for weakness. Patient found to have gram negative rods bacteremia now on Zosyn. Endocrinology consulted for newly diagnosed diabetes.     1.  Newly diagnosed DM with Symptomatic hyperglycemia  - Likely has T2D based on body habitus   - Most recent Hemoglobin A1C 11.4  - Current inpatient regimen: lantus 15, lispro 5 LDSS  - Current diet: CHO/DASH diet   - Please monitor blood glucose values TID AC & QHS while eating regular meals and Q6H while NPO  - Blood glucose goals pre-meal less than 140 mg/dL and random blood glucose less than 180 mg/dL  - Recommendations:   - Fasting glucose at goal, continue with insulin Glargine 15 units QHS  - Limited prandial glucose data, would continue insulin Lispro 5 units TID with meals, hold if NPO or if eating less than 50% of meals  - Continue with low dose correctional scale TID with meals  - Continue with low dose Correctional scale QHS    2. HTN  - BP goal 130/80  - Current -170/70-80  - Manage per primary team     3. 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, can repeat as outpatient and may warrant further genetic testing       Discharge planning:  - Likely Basal/bolus and metformin depending on insulin requirements   - Recommend outpatient endocrinology follow up as patient will need dilated eye exam, follow up with podiatry  - Nutrition consult   - Patient will need insulin pen teaching, glucose testing teaching as well   - Please teach and allow patient to do own finger sticks and insulin injections under RN supervision  - Please teach use of insulin pen  - Please document teach back  - Patient will also need all diabetes supplies including, lancets, glucometer, testing strips, insulin pens, needles at time of discharge      Patient is a 67 M with no known PMH, does not regularly see doctors here for generalized weakness and fatigue, decreased PO intake, polydipsia admitted for weakness. Patient found to have gram negative rods bacteremia now on Zosyn. Endocrinology consulted for newly diagnosed diabetes.     1.  Newly diagnosed DM with Symptomatic hyperglycemia  - Likely has T2D based on body habitus   - Most recent Hemoglobin A1C 11.4  - Current inpatient regimen: lantus 15, lispro 5 LDSS  - Current diet: CHO/DASH diet   - Please monitor blood glucose values TID AC & QHS while eating regular meals and Q6H while NPO  - Blood glucose goals pre-meal less than 140 mg/dL and random blood glucose less than 180 mg/dL  - Recommendations:   - Fasting glucose at goal, continue with insulin Glargine 15 units QHS  - Limited prandial glucose data, would continue insulin Lispro 5 units TID with meals, hold if NPO or if eating less than 50% of meals  - Continue with low dose correctional scale TID with meals  - Continue with low dose Correctional scale QHS    2. HTN  - BP goal 130/80  - Current -170/70-80  - Manage per primary team     3. 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, can repeat as outpatient and may warrant further genetic testing       Discharge planning:  - Likely Basal/bolus vs Basal and metformin depending on insulin requirements   - Recommend outpatient endocrinology follow up as patient will need dilated eye exam, follow up with podiatry  - Nutrition consult   - Patient will need insulin pen teaching, glucose testing teaching as well   - Please teach and allow patient to do own finger sticks and insulin injections under RN supervision  - Please teach use of insulin pen  - Please document teach back  - Patient will also need all diabetes supplies including, lancets, glucometer, testing strips, insulin pens, needles at time of discharge

## 2022-08-12 NOTE — PROGRESS NOTE ADULT - SUBJECTIVE AND OBJECTIVE BOX
Follow Up:      Interval History:    REVIEW OF SYSTEMS  [  ] ROS unobtainable because:    [  ] All other systems negative except as noted below    Constitutional:  [ ] fever [ ] chills  [ ] weight loss  [ ] weakness  Skin:  [ ] rash [ ] phlebitis	  Eyes: [ ] icterus [ ] pain  [ ] discharge	  ENMT: [ ] sore throat  [ ] thrush [ ] ulcers [ ] exudates  Respiratory: [ ] dyspnea [ ] hemoptysis [ ] cough [ ] sputum	  Cardiovascular:  [ ] chest pain [ ] palpitations [ ] edema	  Gastrointestinal:  [ ] nausea [ ] vomiting [ ] diarrhea [ ] constipation [ ] pain	  Genitourinary:  [ ] dysuria [ ] frequency [ ] hematuria [ ] discharge [ ] flank pain  [ ] incontinence  Musculoskeletal:  [ ] myalgias [ ] arthralgias [ ] arthritis  [ ] back pain  Neurological:  [ ] headache [ ] seizures  [ ] confusion/altered mental status    Allergies  No Known Allergies        ANTIMICROBIALS:  cefTRIAXone   IVPB 1000 every 24 hours      OTHER MEDS:  MEDICATIONS  (STANDING):  dextrose 50% Injectable 25 once  dextrose 50% Injectable 12.5 once  dextrose 50% Injectable 25 once  dextrose Oral Gel 15 once PRN  glucagon  Injectable 1 once  heparin   Injectable 5000 every 8 hours  insulin glargine Injectable (LANTUS) 15 at bedtime  insulin lispro (ADMELOG) corrective regimen sliding scale  three times a day before meals  insulin lispro (ADMELOG) corrective regimen sliding scale  at bedtime  insulin lispro Injectable (ADMELOG) 5 three times a day before meals      Vital Signs Last 24 Hrs  T(C): 36.7 (12 Aug 2022 05:51), Max: 37.2 (11 Aug 2022 20:34)  T(F): 98.1 (12 Aug 2022 05:51), Max: 98.9 (11 Aug 2022 20:34)  HR: 72 (12 Aug 2022 05:51) (72 - 89)  BP: 134/66 (12 Aug 2022 05:51) (127/61 - 134/66)  BP(mean): --  RR: 18 (12 Aug 2022 05:51) (18 - 18)  SpO2: 100% (12 Aug 2022 05:51) (99% - 100%)    Parameters below as of 12 Aug 2022 05:51  Patient On (Oxygen Delivery Method): room air        PHYSICAL EXAMINATION:  General: Alert and Awake, NAD  HEENT: PERRL, EOMI  Neck: Supple  Cardiac: RRR, No M/R/G  Resp: CTAB, No Wh/Rh/Ra  Abdomen: NBS, NT/ND, No HSM, No rigidity or guarding  MSK: No LE edema. No Calf tenderness  : No chaudhary  Skin: No rashes or lesions. Skin is warm and dry to the touch.   Neuro: Alert and Awake. CN 2-12 Grossly intact. Moves all four extremities spontaneously.  Psych: Calm, Pleasant, Cooperative                          9.8    8.40  )-----------( 361      ( 12 Aug 2022 07:29 )             29.9       08-12    134<L>  |  99  |  19  ----------------------------<  125<H>  3.5   |  25  |  1.30    Ca    8.4      12 Aug 2022 07:31  Mg     2.0     08-11            MICROBIOLOGY:  v  .Blood Blood-Peripheral  08-09-22   No growth to date.  --  --      Clean Catch Clean Catch (Midstream)  08-08-22   >100,000 CFU/ml Escherichia coli  --  Escherichia coli      .Blood Blood-Peripheral  08-08-22   Growth in aerobic and anaerobic bottles: Escherichia coli  See previous culture [id number]  --    Growth in aerobic and anaerobic bottles: Gram Negative Rods      .Blood Blood-Peripheral  08-08-22   Growth in aerobic bottle: Escherichia coli  Growth in anaerobic bottle: Staphylococcus hominis Coag Negative  Staphylococcus  Single set isolate, possible contaminant. Contact  Microbiology if susceptibility testing clinically  indicated.  Growth in aerobic bottle: Gram positive cocci in pairs  ***Blood Panel PCR results on this specimen are available  approximately 3 hours after the Gram stain result.***  Gram stain, PCR, and/or culture results may not always  correspond due to difference in methodologies.  ************************************************************  This PCR assay was performed by multiplex PCR. This  Assay tests for 66 bacterial and resistance gene targets.  Please refer to the [facility] test directory  at [email or web address] for details.  --  Blood Culture PCR  Escherichia coli                RADIOLOGY:    <The imaging below has been reviewed and visualized by me independently. Findings as detailed in report below> Follow Up:  E coli Bacteremia    Interval History: continued diarrhea. no flank pain.     REVIEW OF SYSTEMS  [  ] ROS unobtainable because:    [ x ] All other systems negative except as noted below    Constitutional:  [ ] fever [ ] chills  [ ] weight loss  [ ] weakness  Skin:  [ ] rash [ ] phlebitis	  Eyes: [ ] icterus [ ] pain  [ ] discharge	  ENMT: [ ] sore throat  [ ] thrush [ ] ulcers [ ] exudates  Respiratory: [ ] dyspnea [ ] hemoptysis [ ] cough [ ] sputum	  Cardiovascular:  [ ] chest pain [ ] palpitations [ ] edema	  Gastrointestinal:  [ ] nausea [ ] vomiting [x ] diarrhea [ ] constipation [ ] pain	  Genitourinary:  [ ] dysuria [ ] frequency [ ] hematuria [ ] discharge [ ] flank pain  [ ] incontinence  Musculoskeletal:  [ ] myalgias [ ] arthralgias [ ] arthritis  [ ] back pain  Neurological:  [ ] headache [ ] seizures  [ ] confusion/altered mental status    Allergies  No Known Allergies        ANTIMICROBIALS:  cefTRIAXone   IVPB 1000 every 24 hours      OTHER MEDS:  MEDICATIONS  (STANDING):  dextrose 50% Injectable 25 once  dextrose 50% Injectable 12.5 once  dextrose 50% Injectable 25 once  dextrose Oral Gel 15 once PRN  glucagon  Injectable 1 once  heparin   Injectable 5000 every 8 hours  insulin glargine Injectable (LANTUS) 15 at bedtime  insulin lispro (ADMELOG) corrective regimen sliding scale  three times a day before meals  insulin lispro (ADMELOG) corrective regimen sliding scale  at bedtime  insulin lispro Injectable (ADMELOG) 5 three times a day before meals      Vital Signs Last 24 Hrs  T(C): 36.7 (12 Aug 2022 05:51), Max: 37.2 (11 Aug 2022 20:34)  T(F): 98.1 (12 Aug 2022 05:51), Max: 98.9 (11 Aug 2022 20:34)  HR: 72 (12 Aug 2022 05:51) (72 - 89)  BP: 134/66 (12 Aug 2022 05:51) (127/61 - 134/66)  BP(mean): --  RR: 18 (12 Aug 2022 05:51) (18 - 18)  SpO2: 100% (12 Aug 2022 05:51) (99% - 100%)    Parameters below as of 12 Aug 2022 05:51  Patient On (Oxygen Delivery Method): room air      PHYSICAL EXAMINATION:  General: Alert and Awake, NAD  HEENT: PERRL, EOMI  Neck: Supple  Cardiac: RRR, No M/R/G  Resp: CTAB, No Wh/Rh/Ra  Abdomen: NBS, NT/ND, No HSM, No rigidity or guarding  MSK: No LE edema. No Calf tenderness  : No CVA tenderness   Skin: No rashes or lesions. Skin is warm and dry to the touch.   Neuro: Alert and Awake. CN 2-12 Grossly intact. Moves all four extremities spontaneously.  Psych: Calm, Pleasant, Cooperative                          9.8    8.40  )-----------( 361      ( 12 Aug 2022 07:29 )             29.9       08-12    134<L>  |  99  |  19  ----------------------------<  125<H>  3.5   |  25  |  1.30    Ca    8.4      12 Aug 2022 07:31  Mg     2.0     08-11            MICROBIOLOGY:  v  .Blood Blood-Peripheral  08-09-22   No growth to date.  --  --      Clean Catch Clean Catch (Midstream)  08-08-22   >100,000 CFU/ml Escherichia coli  --  Escherichia coli      .Blood Blood-Peripheral  08-08-22   Growth in aerobic and anaerobic bottles: Escherichia coli  See previous culture [id number]  --    Growth in aerobic and anaerobic bottles: Gram Negative Rods      .Blood Blood-Peripheral  08-08-22   Growth in aerobic bottle: Escherichia coli  Growth in anaerobic bottle: Staphylococcus hominis Coag Negative  Staphylococcus  Single set isolate, possible contaminant. Contact  Microbiology if susceptibility testing clinically  indicated.  Growth in aerobic bottle: Gram positive cocci in pairs  ***Blood Panel PCR results on this specimen are available  approximately 3 hours after the Gram stain result.***  Gram stain, PCR, and/or culture results may not always  correspond due to difference in methodologies.  ************************************************************  This PCR assay was performed by multiplex PCR. This  Assay tests for 66 bacterial and resistance gene targets.  Please refer to the [facility] test directory  at [email or web address] for details.  --  Blood Culture PCR  Escherichia coli    RADIOLOGY:    <The imaging below has been reviewed and visualized by me independently. Findings as detailed in report below>    < from: US Kidney and Bladder (08.12.22 @ 16:07) >    IMPRESSION:  No evidence of hydronephrosis.    Trace right perinephric fluid.    Increased echogenicity of the left kidney may be related to infection.    Low-level echoes in the bladder may be related to cystitis.    < end of copied text >

## 2022-08-12 NOTE — PROGRESS NOTE ADULT - ASSESSMENT
68 yo M with no known pmhx, does not regualrly see doctors, presents to the ED for generalized weakness/fatigue, decreased PO intake, polydipsia.    # Sepsis ONGOING FEVERS   If continued fevers over next 24-48H would check renal US to exclude development of renal/perirenal abscess  # UTI  Hypothermic/ Hyperthermic   Leukocytosis   Positive UA   Blood cx Urine cx - 8/8 Bcx with E. coli (no ESBL gene detected on PCR) and CoNS    COVID neg   Continue with Ceftriaxone   ID consult appreciated         # Hyperglycemia A1C   Start Lantus HISS   Endocrine consult     # PAO dehydration   iv Fluids     # HTN Start patient on low dose Norvasc

## 2022-08-12 NOTE — PROGRESS NOTE ADULT - ASSESSMENT
68 yo M unknown PMH, has not been to doctor in many years, presenting with weakness, fatigue, decreased po intake, polydipsia/polyuria, and intermittent fever/chills, found to have +UA and Bcx growing E. coli, with CT showing evidence of pyelonephritis.    #E. coli bacteremia, Pyelonephritis, Leukocytosis, Fever  UA 37 RBC, 210 WBC, many bacteria, large LE, nitrite +  8/8 CT A/P: bladder wall thickening and edematous L kidney consistent with ascending UTI   8/8 BCx with E. coli  Renal US (8/12) with no evidence of abscess; only with trace perinephric fluid  --If no more fevers can discharge on Ciprofloxacin 500 mg PO BID with end date: 8/18/22  --Can continue Ceftriaxone 1g IV Q24H while admitted  --Continue to follow CBC with diff  --Continue to follow temperature curve  --Follow up on preliminary blood cultures    #Diarrhea  --If continued diarrhea would check C diff Toxin Assay    #Positive Culture Finding (Coag Neg Staph on BCx)  Likely contaminant  --Follow up on prelim blood cultures     I will be away over this upcoming weekend. Please contact the Infectious Diseases Office with any further questions or concerns.     Chicho Madrid M.D.  [facility] Division of Infectious Disease  8AM-5PM Monday - Friday: Available on [employer] Teams  After Hours and Holidays (or if no response on [employer] Teams): Please contact the Infectious Diseases Office at [phone]     The above assessment and plan were discussed with medicine NP

## 2022-08-12 NOTE — PROGRESS NOTE ADULT - SUBJECTIVE AND OBJECTIVE BOX
Chief Complaint: T2DM    History: Patient continues to spike fevers.  FS have been at goal.     MEDICATIONS  (STANDING):  cefTRIAXone   IVPB 1000 milliGRAM(s) IV Intermittent every 24 hours  chlorhexidine 4% Liquid 1 Application(s) Topical daily  dextrose 5%. 1000 milliLiter(s) (50 mL/Hr) IV Continuous <Continuous>  dextrose 5%. 1000 milliLiter(s) (100 mL/Hr) IV Continuous <Continuous>  dextrose 50% Injectable 25 Gram(s) IV Push once  dextrose 50% Injectable 12.5 Gram(s) IV Push once  dextrose 50% Injectable 25 Gram(s) IV Push once  glucagon  Injectable 1 milliGRAM(s) IntraMuscular once  heparin   Injectable 5000 Unit(s) SubCutaneous every 8 hours  insulin glargine Injectable (LANTUS) 15 Unit(s) SubCutaneous at bedtime  insulin lispro (ADMELOG) corrective regimen sliding scale   SubCutaneous three times a day before meals  insulin lispro (ADMELOG) corrective regimen sliding scale   SubCutaneous at bedtime  insulin lispro Injectable (ADMELOG) 5 Unit(s) SubCutaneous three times a day before meals  lactated ringers. 1000 milliLiter(s) (75 mL/Hr) IV Continuous <Continuous>    MEDICATIONS  (PRN):  dextrose Oral Gel 15 Gram(s) Oral once PRN Blood Glucose LESS THAN 70 milliGRAM(s)/deciliter      Allergies    No Known Allergies    Intolerances      Review of Systems:  Constitutional: No fever  Eyes: No blurry vision  Neuro: No tremors  HEENT: No pain  Cardiovascular: No chest pain, palpitations  Respiratory: No SOB, no cough  GI: No nausea, vomiting, abdominal pain  : No dysuria  Skin: no rash  Psych: no depression  Endocrine: no polyuria, polydipsia      ALL OTHER SYSTEMS REVIEWED AND NEGATIVE        PHYSICAL EXAM:  VITALS: T(C): 36.7 (08-12-22 @ 05:51)  T(F): 98.1 (08-12-22 @ 05:51), Max: 98.9 (08-11-22 @ 20:34)  HR: 72 (08-12-22 @ 05:51) (72 - 89)  BP: 134/66 (08-12-22 @ 05:51) (127/61 - 134/66)  RR:  (18 - 18)  SpO2:  (99% - 100%)  Wt(kg): --  GENERAL: NAD, well-groomed, well-developed  EYES: No proptosis, no lid lag, anicteric  HEENT:  Atraumatic, Normocephalic, moist mucous membranes  RESPIRATORY: Clear to auscultation bilaterally  CARDIOVASCULAR: Regular rate and rhythm  GI: Soft, nontender, non distended, normal bowel sounds  PSYCH: Alert and oriented x 3, normal affect, normal mood      POCT Blood Glucose.: 108 mg/dL (08-12-22 @ 12:26)  POCT Blood Glucose.: 156 mg/dL (08-12-22 @ 08:07)  POCT Blood Glucose.: 143 mg/dL (08-11-22 @ 22:01)  POCT Blood Glucose.: 121 mg/dL (08-11-22 @ 17:08)  POCT Blood Glucose.: 208 mg/dL (08-11-22 @ 13:29)  POCT Blood Glucose.: 117 mg/dL (08-11-22 @ 11:34)  POCT Blood Glucose.: 137 mg/dL (08-11-22 @ 08:14)  POCT Blood Glucose.: 195 mg/dL (08-10-22 @ 22:19)  POCT Blood Glucose.: 135 mg/dL (08-10-22 @ 17:20)  POCT Blood Glucose.: 235 mg/dL (08-10-22 @ 12:24)  POCT Blood Glucose.: 167 mg/dL (08-10-22 @ 08:25)  POCT Blood Glucose.: 138 mg/dL (08-09-22 @ 21:44)  POCT Blood Glucose.: 212 mg/dL (08-09-22 @ 17:00)      08-12    134<L>  |  99  |  19  ----------------------------<  125<H>  3.5   |  25  |  1.30    eGFR: 60    Ca    8.4      08-12  Mg     2.0     08-11            Thyroid Function Tests:  08-08 @ 15:21 TSH 0.81 FreeT4 -- T3 -- Anti TPO -- Anti Thyroglobulin Ab -- TSI --

## 2022-08-12 NOTE — PROGRESS NOTE ADULT - SUBJECTIVE AND OBJECTIVE BOX
Patient is a 67y old  Male who presents with a chief complaint of Weakness x 4 days (09 Aug 2022 14:38)      SUBJECTIVE / OVERNIGHT EVENTS: patient spoking fevers    T(C): 37.6 (08-12-22 @ 20:06), Max: 37.6 (08-12-22 @ 20:06)  HR: 81 (08-12-22 @ 20:06) (81 - 81)  BP: 144/62 (08-12-22 @ 20:06) (144/62 - 144/62)  RR: 18 (08-12-22 @ 20:06) (18 - 18)  SpO2: 99% (08-12-22 @ 20:06) (99% - 99%)      MEDICATIONS  (STANDING):  cefTRIAXone   IVPB 1000 milliGRAM(s) IV Intermittent every 24 hours  chlorhexidine 4% Liquid 1 Application(s) Topical daily  dextrose 5%. 1000 milliLiter(s) (50 mL/Hr) IV Continuous <Continuous>  dextrose 5%. 1000 milliLiter(s) (100 mL/Hr) IV Continuous <Continuous>  dextrose 50% Injectable 25 Gram(s) IV Push once  dextrose 50% Injectable 12.5 Gram(s) IV Push once  dextrose 50% Injectable 25 Gram(s) IV Push once  glucagon  Injectable 1 milliGRAM(s) IntraMuscular once  heparin   Injectable 5000 Unit(s) SubCutaneous every 8 hours  insulin glargine Injectable (LANTUS) 15 Unit(s) SubCutaneous at bedtime  insulin lispro (ADMELOG) corrective regimen sliding scale   SubCutaneous three times a day before meals  insulin lispro (ADMELOG) corrective regimen sliding scale   SubCutaneous at bedtime  insulin lispro Injectable (ADMELOG) 5 Unit(s) SubCutaneous three times a day before meals  lactated ringers. 1000 milliLiter(s) (75 mL/Hr) IV Continuous <Continuous>    MEDICATIONS  (PRN):  dextrose Oral Gel 15 Gram(s) Oral once PRN Blood Glucose LESS THAN 70 milliGRAM(s)/deciliter                PHYSICAL EXAM:  GENERAL: NAD, well-developed  HEAD:  Atraumatic, Normocephalic  EYES: EOMI, conjunctiva and sclera clear  NECK: Supple, No JVD  CHEST/LUNG: Clear to auscultation bilaterally; No wheeze  HEART: Regular rate and rhythm; No murmurs, rubs, or gallops  ABDOMEN: Soft, Nontender, Nondistended; Bowel sounds present  EXTREMITIES:  2+ Peripheral Pulses, No clubbing, cyanosis, or edema  PSYCH: AAOx3  NEUROLOGY: non-focal  SKIN: No rashes or lesions                                            9.8    8.40  )-----------( 361      ( 12 Aug 2022 07:29 )             29.9               134|99|19<125  3.5|25|1.30  8.4,--,--  08-12 @ 07:31      CAPILLARY BLOOD GLUCOSE      POCT Blood Glucose.: 142 mg/dL (12 Aug 2022 22:07)  POCT Blood Glucose.: 152 mg/dL (12 Aug 2022 17:09)  POCT Blood Glucose.: 108 mg/dL (12 Aug 2022 12:26)  POCT Blood Glucose.: 156 mg/dL (12 Aug 2022 08:07)                    Imaging Personally Reviewed:    Consultant(s) Notes Reviewed:      Care Discussed with Consultants/Other Providers:

## 2022-08-13 LAB
ANION GAP SERPL CALC-SCNC: 10 MMOL/L — SIGNIFICANT CHANGE UP (ref 5–17)
BUN SERPL-MCNC: 15 MG/DL — SIGNIFICANT CHANGE UP (ref 7–23)
CALCIUM SERPL-MCNC: 8.9 MG/DL — SIGNIFICANT CHANGE UP (ref 8.4–10.5)
CHLORIDE SERPL-SCNC: 98 MMOL/L — SIGNIFICANT CHANGE UP (ref 96–108)
CO2 SERPL-SCNC: 26 MMOL/L — SIGNIFICANT CHANGE UP (ref 22–31)
CREAT SERPL-MCNC: 1.21 MG/DL — SIGNIFICANT CHANGE UP (ref 0.5–1.3)
EGFR: 66 ML/MIN/1.73M2 — SIGNIFICANT CHANGE UP
GLUCOSE BLDC GLUCOMTR-MCNC: 127 MG/DL — HIGH (ref 70–99)
GLUCOSE BLDC GLUCOMTR-MCNC: 136 MG/DL — HIGH (ref 70–99)
GLUCOSE BLDC GLUCOMTR-MCNC: 170 MG/DL — HIGH (ref 70–99)
GLUCOSE BLDC GLUCOMTR-MCNC: 264 MG/DL — HIGH (ref 70–99)
GLUCOSE SERPL-MCNC: 118 MG/DL — HIGH (ref 70–99)
HCT VFR BLD CALC: 33.2 % — LOW (ref 39–50)
HGB BLD-MCNC: 10.5 G/DL — LOW (ref 13–17)
MCHC RBC-ENTMCNC: 22.5 PG — LOW (ref 27–34)
MCHC RBC-ENTMCNC: 31.6 GM/DL — LOW (ref 32–36)
MCV RBC AUTO: 71.1 FL — LOW (ref 80–100)
NRBC # BLD: 0 /100 WBCS — SIGNIFICANT CHANGE UP (ref 0–0)
PLATELET # BLD AUTO: 537 K/UL — HIGH (ref 150–400)
POTASSIUM SERPL-MCNC: 3.4 MMOL/L — LOW (ref 3.5–5.3)
POTASSIUM SERPL-SCNC: 3.4 MMOL/L — LOW (ref 3.5–5.3)
RBC # BLD: 4.67 M/UL — SIGNIFICANT CHANGE UP (ref 4.2–5.8)
RBC # FLD: 15.3 % — HIGH (ref 10.3–14.5)
SODIUM SERPL-SCNC: 134 MMOL/L — LOW (ref 135–145)
WBC # BLD: 8.46 K/UL — SIGNIFICANT CHANGE UP (ref 3.8–10.5)
WBC # FLD AUTO: 8.46 K/UL — SIGNIFICANT CHANGE UP (ref 3.8–10.5)

## 2022-08-13 PROCEDURE — 99233 SBSQ HOSP IP/OBS HIGH 50: CPT

## 2022-08-13 RX ORDER — POTASSIUM CHLORIDE 20 MEQ
40 PACKET (EA) ORAL ONCE
Refills: 0 | Status: COMPLETED | OUTPATIENT
Start: 2022-08-13 | End: 2022-08-13

## 2022-08-13 RX ADMIN — Medication 1: at 13:01

## 2022-08-13 RX ADMIN — Medication 5 UNIT(S): at 09:05

## 2022-08-13 RX ADMIN — HEPARIN SODIUM 5000 UNIT(S): 5000 INJECTION INTRAVENOUS; SUBCUTANEOUS at 04:56

## 2022-08-13 RX ADMIN — Medication 40 MILLIEQUIVALENT(S): at 09:04

## 2022-08-13 RX ADMIN — INSULIN GLARGINE 15 UNIT(S): 100 INJECTION, SOLUTION SUBCUTANEOUS at 21:49

## 2022-08-13 RX ADMIN — Medication 5 UNIT(S): at 17:35

## 2022-08-13 RX ADMIN — CHLORHEXIDINE GLUCONATE 1 APPLICATION(S): 213 SOLUTION TOPICAL at 08:14

## 2022-08-13 RX ADMIN — Medication 1: at 21:48

## 2022-08-13 RX ADMIN — CEFTRIAXONE 100 MILLIGRAM(S): 500 INJECTION, POWDER, FOR SOLUTION INTRAMUSCULAR; INTRAVENOUS at 19:34

## 2022-08-13 RX ADMIN — Medication 5 UNIT(S): at 13:02

## 2022-08-13 NOTE — PROGRESS NOTE ADULT - ASSESSMENT
Patient is a 67 M with no known PMH, does not regularly see doctors here for generalized weakness and fatigue, decreased PO intake, polydipsia admitted for weakness. Patient found to have gram negative rods bacteremia now on Zosyn. Endocrinology consulted for newly diagnosed diabetes.       Met with patient in person & girlfriend Nasreen over pts telephone call  and reviewed the following:    -A1c LEVEL: Present and goal   -Blood glucose goals: 100s to 150s as out pt  -Glucose monitoring frequency: ac and hs  -Hypoglycemia prevention,detection and treatment  -Healthy eating and portion control- need RD assessment new DM ; pt also reports he only eats every few days.. per girlfriend pt doesnt live with her all of the time- would ensure pt has access to food and food security   -Insulin(s) action, time of administration and side effects 70/30 ; & metformin/glipizide pharmacokinetics and do not take glipizde if not eating   -Importance of follow up care- clinic phone # provided and arranging appt   Educated patient on current and potential complications of Diabetes (Retinopathy, neuropathy, nephropathy, MI, stroke, delayed wound healing )  Spent over 35 minutes providing face to face education.  Pt is worried about using insulin injections he reports fear of other people thinking he is on illegal drugs & does not want to take injections.  educated pt insulin is a life saving medication used to control BG and improve outcomes in Diabetes and that his health is number 1 priority, if he is concerned about external assumptions he can inject in private, pt continues to decline and says if we prescribe insulin he will not be compliant with it.   Pt only amenable to PO meds and declines injections even after education. Educated pt in future may be told insulin  will be needed, pt plans to retire in [facility] soon and is unclear if he will follow up here but is open to us making clinic appt . Educated pt we will make him a clinic appt for f/u .   Pt is not receptive to education, appears overwhelmed as this is new DM dx  , asking if DM is life long and how did this happen. Educated pt and provided emotional support. Pt needs continued reinforcement and education.  D/w RN RN attempted to educated on vial/syringe this am and pt was not receptive to education as well.     1.  Newly diagnosed DM with Symptomatic hyperglycemia  - Likely has T2D based on body habitus   - Most recent Hemoglobin A1C 11.4  - Current inpatient regimen: lantus 15, lispro 5 LDSS  - Current diet: CHO/DASH diet   - Please monitor blood glucose values TID AC & QHS while eating regular meals and Q6H while NPO  - Blood glucose goals pre-meal less than 140 mg/dL and random blood glucose less than 180 mg/dL  - Recommendations:   - Fasting glucose at goal, continue with insulin Glargine 15 units QHS  - continue insulin Lispro 5 units TID with meals, hold if NPO or if eating less than 50% of meals  - Continue with low dose correctional scale TID with meals  - Continue with low dose Correctional scale QHS  - pt has poor health literacy please continue to reinforce DM education/FS Monitoring with pt on daily basis  dispo plan:  uninsured at this time, preferred plan would be basal bolus or humulin 70/30 although pt has endorsed he will NOT be compliant with this, so will need to discharge on orals .   Will Consider metformin & Glipizide based on eGFR/LFTS ; do not take glipizde if not eating ;   need social work followup ensure if pt has food security?   need glucometer/ testing supplies  f/u [facility] phone: [phone] > need to arrange f/u at discharge    Recommend outpatient endocrinology follow up as patient will need dilated eye exam, follow up with podiatry    2. HTN  - BP goal 130/80  - Current -170/70-80  - Manage per primary team     3. 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, can repeat as outpatient and may warrant further genetic testing         Discussed with patient and primary  team Regi LOVE & RN   Contact via [employer] Teams during business hours  On evenings and weekends, please call [phone] or page endocrine fellow on call.   Email: [email or web address]   Please note that this patient may be followed by different provider tomorrow.    greater than 50% of the encounter was spent counseling and/or coordination of care.  45 minutes spent on total encounter; The necessity of the time spent during the encounter on this date of service was due to development of plan of care/coordination of care/glycemic control through review of labs, blood glucose values and vital signs.  Patient is a 67 M with no known PMH, does not regularly see doctors here for generalized weakness and fatigue, decreased PO intake, polydipsia admitted for weakness. Patient found to have gram negative rods bacteremia now on Zosyn. Endocrinology consulted for newly diagnosed diabetes.       Met with patient in person & girlfriend Nasreen over pts telephone call  and reviewed the following:    -A1c LEVEL: Present and goal   -Blood glucose goals: 100s to 150s as out pt  -Glucose monitoring frequency: ac and hs  -Hypoglycemia prevention,detection and treatment  -Healthy eating and portion control- need RD assessment new DM ; pt also reports he only eats every few days.. per girlfriend pt doesnt live with her all of the time- would ensure pt has access to food and food security   -Insulin(s) action, time of administration and side effects 70/30 ; & metformin/glipizide pharmacokinetics and do not take glipizde if not eating   -Importance of follow up care- clinic phone # provided and arranging appt   Educated patient on current and potential complications of Diabetes (Retinopathy, neuropathy, nephropathy, MI, stroke, delayed wound healing )  provided and reviewed Living w/ t2dm & insulin vial and syringe education sheets   Spent over 35 minutes providing face to face education.  Pt is worried about using insulin injections he reports fear of other people thinking he is on illegal drugs & does not want to take injections.  educated pt insulin is a life saving medication used to control BG and improve outcomes in Diabetes and that his health is number 1 priority, if he is concerned about external assumptions he can inject in private, pt continues to decline and says if we prescribe insulin he will not be compliant with it.   Pt only amenable to PO meds and declines injections even after education. Educated pt in future may be told insulin  will be needed, pt plans to retire in [place] soon and is unclear if he will follow up here but is open to us making clinic appt . Educated pt we will make him a clinic appt for f/u .   Pt is not receptive to education, appears overwhelmed as this is new DM dx  , asking if DM is life long and how did this happen. Educated pt and provided emotional support. Pt needs continued reinforcement and education.  D/w RN RN attempted to educated on vial/syringe this am and pt was not receptive to education as well.     1.  Newly diagnosed DM with Symptomatic hyperglycemia  - Likely has T2D based on body habitus   - Most recent Hemoglobin A1C 11.4  - Current inpatient regimen: lantus 15, lispro 5 LDSS  - Current diet: CHO/DASH diet   - Please monitor blood glucose values TID AC & QHS while eating regular meals and Q6H while NPO  - Blood glucose goals pre-meal less than 140 mg/dL and random blood glucose less than 180 mg/dL  - Recommendations:   - Fasting glucose at goal, continue with insulin Glargine 15 units QHS  - continue insulin Lispro 5 units TID with meals, hold if NPO or if eating less than 50% of meals  - Continue with low dose correctional scale TID with meals  - Continue with low dose Correctional scale QHS  - pt has poor health literacy please continue to reinforce DM education/FS Monitoring with pt on daily basis  dispo plan:  uninsured at this time, preferred plan would be basal bolus or humulin 70/30 although pt has endorsed he will NOT be compliant with this, so will need to discharge on orals .   Will Consider metformin & Glipizide based on eGFR/LFTS ; do not take glipizde if not eating ;   need social work followup ensure if pt has food security?   need glucometer/ testing supplies  f/u [facility] phone: [phone] > need to arrange f/u at discharge    Recommend outpatient endocrinology follow up as patient will need dilated eye exam, follow up with podiatry    2. HTN  - BP goal 130/80  - Current -170/70-80  - Manage per primary team     3. 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, can repeat as outpatient and may warrant further genetic testing         Discussed with patient and primary  team Regi LOVE & RN   Contact via Microsoft Teams during business hours  On evenings and weekends, please call [phone] or page endocrine fellow on call.   Email: [email or web address]   Please note that this patient may be followed by different provider tomorrow.    greater than 50% of the encounter was spent counseling and/or coordination of care.  45 minutes spent on total encounter; The necessity of the time spent during the encounter on this date of service was due to development of plan of care/coordination of care/glycemic control through review of labs, blood glucose values and vital signs.

## 2022-08-13 NOTE — PROGRESS NOTE ADULT - SUBJECTIVE AND OBJECTIVE BOX
Patient is a 67y old  Male who presents with a chief complaint of Weakness x 4 days (09 Aug 2022 14:38)      SUBJECTIVE / OVERNIGHT EVENTS: patient afebrile     T(C): 37.6 (08-13-22 @ 20:02), Max: 37.6 (08-13-22 @ 20:02)  HR: 74 (08-13-22 @ 20:02) (61 - 74)  BP: 127/67 (08-13-22 @ 20:02) (127/67 - 145/69)  RR: 18 (08-13-22 @ 20:02) (18 - 18)  SpO2: 99% (08-13-22 @ 20:02) (98% - 99%)    MEDICATIONS  (STANDING):  cefTRIAXone   IVPB 1000 milliGRAM(s) IV Intermittent every 24 hours  chlorhexidine 4% Liquid 1 Application(s) Topical daily  dextrose 5%. 1000 milliLiter(s) (50 mL/Hr) IV Continuous <Continuous>  dextrose 5%. 1000 milliLiter(s) (100 mL/Hr) IV Continuous <Continuous>  dextrose 50% Injectable 25 Gram(s) IV Push once  dextrose 50% Injectable 12.5 Gram(s) IV Push once  dextrose 50% Injectable 25 Gram(s) IV Push once  glucagon  Injectable 1 milliGRAM(s) IntraMuscular once  heparin   Injectable 5000 Unit(s) SubCutaneous every 8 hours  insulin glargine Injectable (LANTUS) 15 Unit(s) SubCutaneous at bedtime  insulin lispro (ADMELOG) corrective regimen sliding scale   SubCutaneous three times a day before meals  insulin lispro (ADMELOG) corrective regimen sliding scale   SubCutaneous at bedtime  insulin lispro Injectable (ADMELOG) 5 Unit(s) SubCutaneous three times a day before meals  lactated ringers. 1000 milliLiter(s) (75 mL/Hr) IV Continuous <Continuous>    MEDICATIONS  (PRN):  dextrose Oral Gel 15 Gram(s) Oral once PRN Blood Glucose LESS THAN 70 milliGRAM(s)/deciliter  MEDICATIONS  (PRN):  dextrose Oral Gel 15 Gram(s) Oral once PRN Blood Glucose LESS THAN 70 milliGRAM(s)/deciliter                PHYSICAL EXAM:  GENERAL: NAD, well-developed  HEAD:  Atraumatic, Normocephalic  EYES: EOMI, conjunctiva and sclera clear  NECK: Supple, No JVD  CHEST/LUNG: Clear to auscultation bilaterally; No wheeze  HEART: Regular rate and rhythm; No murmurs, rubs, or gallops  ABDOMEN: Soft, Nontender, Nondistended; Bowel sounds present  EXTREMITIES:  2+ Peripheral Pulses, No clubbing, cyanosis, or edema  PSYCH: AAOx3  NEUROLOGY: non-focal  SKIN: No rashes or lesions                                       10.5   8.46  )-----------( 537      ( 13 Aug 2022 07:20 )             33.2               134|98|15<118  3.4|26|1.21  8.9,--,--  08-13 @ 07:23      CAPILLARY BLOOD GLUCOSE      POCT Blood Glucose.: 136 mg/dL (13 Aug 2022 17:07)  POCT Blood Glucose.: 170 mg/dL (13 Aug 2022 12:49)  POCT Blood Glucose.: 127 mg/dL (13 Aug 2022 08:47)  POCT Blood Glucose.: 142 mg/dL (12 Aug 2022 22:07)    Imaging Personally Reviewed:    Consultant(s) Notes Reviewed:      Care Discussed with Consultants/Other Providers:

## 2022-08-13 NOTE — PROGRESS NOTE ADULT - SUBJECTIVE AND OBJECTIVE BOX
seen earlier today     Chief Complaint: New DM     INTERVAL HX:   BG stable past 24 hours    upon visit prior to lunch noted pt snacking on crackers prior to FS check, pt reports tolerating po while here. counseled pt on need to avoid snacking between meals to avoid BG fluctuations. pt states " i wont take any injections people will think im doing crack" provided extensive education please seen below.     Review of Systems:  General: As above  Cardiovascular: No chest pain  Respiratory: No SOB  GI: No nausea, vomiting  Endocrine: no  S&Sx of hypoglycemia    Allergies    No Known Allergies    Intolerances      MEDICATIONS  (STANDING):  cefTRIAXone   IVPB 1000 milliGRAM(s) IV Intermittent every 24 hours  chlorhexidine 4% Liquid 1 Application(s) Topical daily  dextrose 5%. 1000 milliLiter(s) (50 mL/Hr) IV Continuous <Continuous>  dextrose 5%. 1000 milliLiter(s) (100 mL/Hr) IV Continuous <Continuous>  dextrose 50% Injectable 25 Gram(s) IV Push once  dextrose 50% Injectable 12.5 Gram(s) IV Push once  dextrose 50% Injectable 25 Gram(s) IV Push once  glucagon  Injectable 1 milliGRAM(s) IntraMuscular once  heparin   Injectable 5000 Unit(s) SubCutaneous every 8 hours  insulin glargine Injectable (LANTUS) 15 Unit(s) SubCutaneous at bedtime  insulin lispro (ADMELOG) corrective regimen sliding scale   SubCutaneous three times a day before meals  insulin lispro (ADMELOG) corrective regimen sliding scale   SubCutaneous at bedtime  insulin lispro Injectable (ADMELOG) 5 Unit(s) SubCutaneous three times a day before meals  lactated ringers. 1000 milliLiter(s) (75 mL/Hr) IV Continuous <Continuous>        insulin glargine Injectable (LANTUS)   15 Unit(s) SubCutaneous (08-12-22 @ 22:29)    insulin lispro (ADMELOG) corrective regimen sliding scale   1  SubCutaneous (08-13-22 @ 13:01)   1 Unit(s) SubCutaneous (08-12-22 @ 18:31)    insulin lispro Injectable (ADMELOG)   5 Unit(s) SubCutaneous (08-13-22 @ 13:02)   5 Unit(s) SubCutaneous (08-13-22 @ 09:05)   5 Unit(s) SubCutaneous (08-12-22 @ 18:32)        PHYSICAL EXAM:  VITALS: T(C): 37.2 (08-13-22 @ 12:40)  T(F): 99 (08-13-22 @ 12:40), Max: 99.7 (08-12-22 @ 20:06)  HR: 61 (08-13-22 @ 12:40) (61 - 81)  BP: 145/69 (08-13-22 @ 12:40) (123/65 - 151/66)  RR:  (18 - 18)  SpO2:  (98% - 99%)  Wt(kg): --  GENERAL: male laying in bed in NAD  Respiratory: Respirations unlabored   Extremities: Warm, no edema  NEURO: Alert , appropriate       LABS:    POCT Blood Glucose.: 170 mg/dL (08-13-22 @ 12:49)  POCT Blood Glucose.: 127 mg/dL (08-13-22 @ 08:47)  POCT Blood Glucose.: 142 mg/dL (08-12-22 @ 22:07)  POCT Blood Glucose.: 152 mg/dL (08-12-22 @ 17:09)  POCT Blood Glucose.: 108 mg/dL (08-12-22 @ 12:26)  POCT Blood Glucose.: 156 mg/dL (08-12-22 @ 08:07)  POCT Blood Glucose.: 143 mg/dL (08-11-22 @ 22:01)  POCT Blood Glucose.: 121 mg/dL (08-11-22 @ 17:08)  POCT Blood Glucose.: 208 mg/dL (08-11-22 @ 13:29)  POCT Blood Glucose.: 117 mg/dL (08-11-22 @ 11:34)  POCT Blood Glucose.: 137 mg/dL (08-11-22 @ 08:14)  POCT Blood Glucose.: 195 mg/dL (08-10-22 @ 22:19)  POCT Blood Glucose.: 135 mg/dL (08-10-22 @ 17:20)                            10.5   8.46  )-----------( 537      ( 13 Aug 2022 07:20 )             33.2       08-13    134<L>  |  98  |  15  ----------------------------<  118<H>  3.4<L>   |  26  |  1.21    Ca    8.9      13 Aug 2022 07:23        eGFR: 66 mL/min/1.73m2 (13 Aug 2022 07:23)      08-10 Chol 84 Direct LDL -- LDL calculated 42 HDL 15<L> Trig 140    Thyroid Function Tests:  08-08 @ 15:21 TSH 0.81 FreeT4 -- T3 -- Anti TPO -- Anti Thyroglobulin Ab -- TSI --          A1C with Estimated Average Glucose Result: 11.4 % (08-09-22 @ 06:20)      Estimated Average Glucose: 280 mg/dL (08-09-22 @ 06:20)

## 2022-08-13 NOTE — PROGRESS NOTE ADULT - ASSESSMENT
66 yo M with no known pmhx, does not regualrly see doctors, presents to the ED for generalized weakness/fatigue, decreased PO intake, polydipsia.    # Sepsis ONGOING FEVERS   Renal sono     # UTI  Hypothermic/ Hyperthermic   Leukocytosis   Positive UA   Blood cx Urine cx - 8/8 Bcx with E. coli (no ESBL gene detected on PCR) and CoNS    COVID neg   Continue with Ceftriaxone   ID consult appreciated         # Hyperglycemia A1C   Start Lantus HISS   Endocrine consult     # PAO dehydration   iv Fluids     # HTN Start patient on low dose Norvasc

## 2022-08-14 LAB
ANION GAP SERPL CALC-SCNC: 8 MMOL/L — SIGNIFICANT CHANGE UP (ref 5–17)
BUN SERPL-MCNC: 16 MG/DL — SIGNIFICANT CHANGE UP (ref 7–23)
CALCIUM SERPL-MCNC: 8.7 MG/DL — SIGNIFICANT CHANGE UP (ref 8.4–10.5)
CHLORIDE SERPL-SCNC: 100 MMOL/L — SIGNIFICANT CHANGE UP (ref 96–108)
CO2 SERPL-SCNC: 25 MMOL/L — SIGNIFICANT CHANGE UP (ref 22–31)
CREAT SERPL-MCNC: 1.21 MG/DL — SIGNIFICANT CHANGE UP (ref 0.5–1.3)
EGFR: 66 ML/MIN/1.73M2 — SIGNIFICANT CHANGE UP
GLUCOSE BLDC GLUCOMTR-MCNC: 109 MG/DL — HIGH (ref 70–99)
GLUCOSE BLDC GLUCOMTR-MCNC: 135 MG/DL — HIGH (ref 70–99)
GLUCOSE BLDC GLUCOMTR-MCNC: 198 MG/DL — HIGH (ref 70–99)
GLUCOSE BLDC GLUCOMTR-MCNC: 99 MG/DL — SIGNIFICANT CHANGE UP (ref 70–99)
GLUCOSE SERPL-MCNC: 111 MG/DL — HIGH (ref 70–99)
POTASSIUM SERPL-MCNC: 3.9 MMOL/L — SIGNIFICANT CHANGE UP (ref 3.5–5.3)
POTASSIUM SERPL-SCNC: 3.9 MMOL/L — SIGNIFICANT CHANGE UP (ref 3.5–5.3)
SODIUM SERPL-SCNC: 133 MMOL/L — LOW (ref 135–145)

## 2022-08-14 RX ORDER — INSULIN GLARGINE 100 [IU]/ML
13 INJECTION, SOLUTION SUBCUTANEOUS AT BEDTIME
Refills: 0 | Status: DISCONTINUED | OUTPATIENT
Start: 2022-08-14 | End: 2022-08-15

## 2022-08-14 RX ORDER — INSULIN LISPRO 100/ML
4 VIAL (ML) SUBCUTANEOUS
Refills: 0 | Status: DISCONTINUED | OUTPATIENT
Start: 2022-08-14 | End: 2022-08-15

## 2022-08-14 RX ADMIN — CEFTRIAXONE 100 MILLIGRAM(S): 500 INJECTION, POWDER, FOR SOLUTION INTRAMUSCULAR; INTRAVENOUS at 18:13

## 2022-08-14 RX ADMIN — CHLORHEXIDINE GLUCONATE 1 APPLICATION(S): 213 SOLUTION TOPICAL at 13:59

## 2022-08-14 RX ADMIN — HEPARIN SODIUM 5000 UNIT(S): 5000 INJECTION INTRAVENOUS; SUBCUTANEOUS at 21:43

## 2022-08-14 RX ADMIN — Medication 5 UNIT(S): at 08:57

## 2022-08-14 RX ADMIN — Medication 5 UNIT(S): at 13:58

## 2022-08-14 RX ADMIN — INSULIN GLARGINE 13 UNIT(S): 100 INJECTION, SOLUTION SUBCUTANEOUS at 21:42

## 2022-08-14 RX ADMIN — HEPARIN SODIUM 5000 UNIT(S): 5000 INJECTION INTRAVENOUS; SUBCUTANEOUS at 15:27

## 2022-08-14 RX ADMIN — Medication 4 UNIT(S): at 17:22

## 2022-08-14 NOTE — PROGRESS NOTE ADULT - SUBJECTIVE AND OBJECTIVE BOX
Patient is a 67y old  Male who presents with a chief complaint of Weakness x 4 days (09 Aug 2022 14:38)      SUBJECTIVE / OVERNIGHT EVENTS: patient afebrile       T(C): 37.3 (08-14-22 @ 18:51), Max: 37.5 (08-14-22 @ 13:35)  HR: 69 (08-14-22 @ 18:51) (69 - 70)  BP: 152/72 (08-14-22 @ 18:51) (152/62 - 152/72)  RR: 18 (08-14-22 @ 18:51) (18 - 18)  SpO2: 97% (08-14-22 @ 18:51) (96% - 97%)    MEDICATIONS  (STANDING):  cefTRIAXone   IVPB 1000 milliGRAM(s) IV Intermittent every 24 hours  chlorhexidine 4% Liquid 1 Application(s) Topical daily  dextrose 5%. 1000 milliLiter(s) (50 mL/Hr) IV Continuous <Continuous>  dextrose 5%. 1000 milliLiter(s) (100 mL/Hr) IV Continuous <Continuous>  dextrose 50% Injectable 25 Gram(s) IV Push once  dextrose 50% Injectable 12.5 Gram(s) IV Push once  dextrose 50% Injectable 25 Gram(s) IV Push once  glucagon  Injectable 1 milliGRAM(s) IntraMuscular once  heparin   Injectable 5000 Unit(s) SubCutaneous every 8 hours  insulin glargine Injectable (LANTUS) 13 Unit(s) SubCutaneous at bedtime  insulin lispro (ADMELOG) corrective regimen sliding scale   SubCutaneous three times a day before meals  insulin lispro (ADMELOG) corrective regimen sliding scale   SubCutaneous at bedtime  insulin lispro Injectable (ADMELOG) 4 Unit(s) SubCutaneous three times a day before meals  lactated ringers. 1000 milliLiter(s) (75 mL/Hr) IV Continuous <Continuous>    MEDICATIONS  (PRN):  dextrose Oral Gel 15 Gram(s) Oral once PRN Blood Glucose LESS THAN 70 milliGRAM(s)/deciliter              PHYSICAL EXAM:  GENERAL: NAD, well-developed  HEAD:  Atraumatic, Normocephalic  EYES: EOMI, conjunctiva and sclera clear  NECK: Supple, No JVD  CHEST/LUNG: Clear to auscultation bilaterally; No wheeze  HEART: Regular rate and rhythm; No murmurs, rubs, or gallops  ABDOMEN: Soft, Nontender, Nondistended; Bowel sounds present  EXTREMITIES:  2+ Peripheral Pulses, No clubbing, cyanosis, or edema  PSYCH: AAOx3  NEUROLOGY: non-focal  SKIN: No rashes or lesions                                                        10.5   8.46  )-----------( 537      ( 13 Aug 2022 07:20 )             33.2               133|100|16<111  3.9|25|1.21  8.7,--,--  08-14 @ 07:30                          10.5   8.46  )-----------( 537      ( 13 Aug 2022 07:20 )             33.2               133|100|16<111  3.9|25|1.21  8.7,--,--  08-14 @ 07:30  Consultant(s) Notes Reviewed:      Care Discussed with Consultants/Other Providers:

## 2022-08-14 NOTE — PROGRESS NOTE ADULT - ASSESSMENT
68 yo M with no known pmhx, does not regualrly see doctors, presents to the ED for generalized weakness/fatigue, decreased PO intake, polydipsia.    # Sepsis resolving   Renal sono increased left kidney echogenicity      # UTI  Hypothermic/ Hyperthermic   Leukocytosis   Positive UA   Blood cx Urine cx - 8/8 Bcx with E. coli (no ESBL gene detected on PCR) and CoNS    COVID neg   Continue with Ceftriaxone   ID consult appreciated         # Hyperglycemia A1C   Start Lantus HISS   Endocrine consult     # PAO dehydration   iv Fluids     # HTN Start patient on low dose Norvasc

## 2022-08-15 ENCOUNTER — TRANSCRIPTION ENCOUNTER (OUTPATIENT)
Age: 67
End: 2022-08-15

## 2022-08-15 VITALS — HEART RATE: 69 BPM

## 2022-08-15 LAB
ALBUMIN SERPL ELPH-MCNC: 2.7 G/DL — LOW (ref 3.3–5)
ALP SERPL-CCNC: 100 U/L — SIGNIFICANT CHANGE UP (ref 40–120)
ALT FLD-CCNC: 36 U/L — SIGNIFICANT CHANGE UP (ref 10–45)
ANION GAP SERPL CALC-SCNC: 10 MMOL/L — SIGNIFICANT CHANGE UP (ref 5–17)
AST SERPL-CCNC: 23 U/L — SIGNIFICANT CHANGE UP (ref 10–40)
BILIRUB SERPL-MCNC: 0.3 MG/DL — SIGNIFICANT CHANGE UP (ref 0.2–1.2)
BUN SERPL-MCNC: 13 MG/DL — SIGNIFICANT CHANGE UP (ref 7–23)
CALCIUM SERPL-MCNC: 9 MG/DL — SIGNIFICANT CHANGE UP (ref 8.4–10.5)
CHLORIDE SERPL-SCNC: 99 MMOL/L — SIGNIFICANT CHANGE UP (ref 96–108)
CO2 SERPL-SCNC: 25 MMOL/L — SIGNIFICANT CHANGE UP (ref 22–31)
CREAT SERPL-MCNC: 1.09 MG/DL — SIGNIFICANT CHANGE UP (ref 0.5–1.3)
CULTURE RESULTS: SIGNIFICANT CHANGE UP
CULTURE RESULTS: SIGNIFICANT CHANGE UP
EGFR: 74 ML/MIN/1.73M2 — SIGNIFICANT CHANGE UP
GLUCOSE BLDC GLUCOMTR-MCNC: 135 MG/DL — HIGH (ref 70–99)
GLUCOSE BLDC GLUCOMTR-MCNC: 174 MG/DL — HIGH (ref 70–99)
GLUCOSE BLDC GLUCOMTR-MCNC: 89 MG/DL — SIGNIFICANT CHANGE UP (ref 70–99)
GLUCOSE SERPL-MCNC: 117 MG/DL — HIGH (ref 70–99)
HCT VFR BLD CALC: 30.1 % — LOW (ref 39–50)
HGB BLD-MCNC: 9.5 G/DL — LOW (ref 13–17)
MAGNESIUM SERPL-MCNC: 1.8 MG/DL — SIGNIFICANT CHANGE UP (ref 1.6–2.6)
MCHC RBC-ENTMCNC: 22.2 PG — LOW (ref 27–34)
MCHC RBC-ENTMCNC: 31.6 GM/DL — LOW (ref 32–36)
MCV RBC AUTO: 70.3 FL — LOW (ref 80–100)
NRBC # BLD: 0 /100 WBCS — SIGNIFICANT CHANGE UP (ref 0–0)
PHOSPHATE SERPL-MCNC: 3.2 MG/DL — SIGNIFICANT CHANGE UP (ref 2.5–4.5)
PLATELET # BLD AUTO: 648 K/UL — HIGH (ref 150–400)
POTASSIUM SERPL-MCNC: 4.2 MMOL/L — SIGNIFICANT CHANGE UP (ref 3.5–5.3)
POTASSIUM SERPL-SCNC: 4.2 MMOL/L — SIGNIFICANT CHANGE UP (ref 3.5–5.3)
PROT SERPL-MCNC: 6.7 G/DL — SIGNIFICANT CHANGE UP (ref 6–8.3)
RBC # BLD: 4.28 M/UL — SIGNIFICANT CHANGE UP (ref 4.2–5.8)
RBC # FLD: 15.5 % — HIGH (ref 10.3–14.5)
SARS-COV-2 RNA SPEC QL NAA+PROBE: SIGNIFICANT CHANGE UP
SODIUM SERPL-SCNC: 134 MMOL/L — LOW (ref 135–145)
SPECIMEN SOURCE: SIGNIFICANT CHANGE UP
SPECIMEN SOURCE: SIGNIFICANT CHANGE UP
WBC # BLD: 6.05 K/UL — SIGNIFICANT CHANGE UP (ref 3.8–10.5)
WBC # FLD AUTO: 6.05 K/UL — SIGNIFICANT CHANGE UP (ref 3.8–10.5)

## 2022-08-15 PROCEDURE — 85025 COMPLETE CBC W/AUTO DIFF WBC: CPT

## 2022-08-15 PROCEDURE — 82435 ASSAY OF BLOOD CHLORIDE: CPT

## 2022-08-15 PROCEDURE — 71045 X-RAY EXAM CHEST 1 VIEW: CPT

## 2022-08-15 PROCEDURE — 80048 BASIC METABOLIC PNL TOTAL CA: CPT

## 2022-08-15 PROCEDURE — 36415 COLL VENOUS BLD VENIPUNCTURE: CPT

## 2022-08-15 PROCEDURE — 87641 MR-STAPH DNA AMP PROBE: CPT

## 2022-08-15 PROCEDURE — 85610 PROTHROMBIN TIME: CPT

## 2022-08-15 PROCEDURE — 96375 TX/PRO/DX INJ NEW DRUG ADDON: CPT

## 2022-08-15 PROCEDURE — 80061 LIPID PANEL: CPT

## 2022-08-15 PROCEDURE — 87637 SARSCOV2&INF A&B&RSV AMP PRB: CPT

## 2022-08-15 PROCEDURE — 83690 ASSAY OF LIPASE: CPT

## 2022-08-15 PROCEDURE — 85027 COMPLETE CBC AUTOMATED: CPT

## 2022-08-15 PROCEDURE — 85014 HEMATOCRIT: CPT

## 2022-08-15 PROCEDURE — 87640 STAPH A DNA AMP PROBE: CPT

## 2022-08-15 PROCEDURE — 83930 ASSAY OF BLOOD OSMOLALITY: CPT

## 2022-08-15 PROCEDURE — 76770 US EXAM ABDO BACK WALL COMP: CPT

## 2022-08-15 PROCEDURE — 82803 BLOOD GASES ANY COMBINATION: CPT

## 2022-08-15 PROCEDURE — 87186 SC STD MICRODIL/AGAR DIL: CPT

## 2022-08-15 PROCEDURE — 85018 HEMOGLOBIN: CPT

## 2022-08-15 PROCEDURE — 82962 GLUCOSE BLOOD TEST: CPT

## 2022-08-15 PROCEDURE — U0003: CPT

## 2022-08-15 PROCEDURE — 83036 HEMOGLOBIN GLYCOSYLATED A1C: CPT

## 2022-08-15 PROCEDURE — 87389 HIV-1 AG W/HIV-1&-2 AB AG IA: CPT

## 2022-08-15 PROCEDURE — 81001 URINALYSIS AUTO W/SCOPE: CPT

## 2022-08-15 PROCEDURE — 84443 ASSAY THYROID STIM HORMONE: CPT

## 2022-08-15 PROCEDURE — U0005: CPT

## 2022-08-15 PROCEDURE — 87077 CULTURE AEROBIC IDENTIFY: CPT

## 2022-08-15 PROCEDURE — 80053 COMPREHEN METABOLIC PANEL: CPT

## 2022-08-15 PROCEDURE — 82330 ASSAY OF CALCIUM: CPT

## 2022-08-15 PROCEDURE — 82010 KETONE BODYS QUAN: CPT

## 2022-08-15 PROCEDURE — 84100 ASSAY OF PHOSPHORUS: CPT

## 2022-08-15 PROCEDURE — 87040 BLOOD CULTURE FOR BACTERIA: CPT

## 2022-08-15 PROCEDURE — 99285 EMERGENCY DEPT VISIT HI MDM: CPT

## 2022-08-15 PROCEDURE — 84132 ASSAY OF SERUM POTASSIUM: CPT

## 2022-08-15 PROCEDURE — 96374 THER/PROPH/DIAG INJ IV PUSH: CPT

## 2022-08-15 PROCEDURE — 83735 ASSAY OF MAGNESIUM: CPT

## 2022-08-15 PROCEDURE — 83605 ASSAY OF LACTIC ACID: CPT

## 2022-08-15 PROCEDURE — 85730 THROMBOPLASTIN TIME PARTIAL: CPT

## 2022-08-15 PROCEDURE — 87150 DNA/RNA AMPLIFIED PROBE: CPT

## 2022-08-15 PROCEDURE — 87086 URINE CULTURE/COLONY COUNT: CPT

## 2022-08-15 PROCEDURE — 74176 CT ABD & PELVIS W/O CONTRAST: CPT

## 2022-08-15 PROCEDURE — 99232 SBSQ HOSP IP/OBS MODERATE 35: CPT

## 2022-08-15 PROCEDURE — 82947 ASSAY GLUCOSE BLOOD QUANT: CPT

## 2022-08-15 PROCEDURE — 86803 HEPATITIS C AB TEST: CPT

## 2022-08-15 PROCEDURE — 84295 ASSAY OF SERUM SODIUM: CPT

## 2022-08-15 RX ORDER — METFORMIN HYDROCHLORIDE 850 MG/1
1 TABLET ORAL
Qty: 28 | Refills: 0
Start: 2022-08-15 | End: 2022-08-28

## 2022-08-15 RX ORDER — LISINOPRIL 2.5 MG/1
1 TABLET ORAL
Qty: 30 | Refills: 0
Start: 2022-08-15 | End: 2022-09-13

## 2022-08-15 RX ORDER — ISOPROPYL ALCOHOL, BENZOCAINE .7; .06 ML/ML; ML/ML
1 SWAB TOPICAL
Qty: 100 | Refills: 1
Start: 2022-08-15 | End: 2022-10-03

## 2022-08-15 RX ORDER — INSULIN GLARGINE 100 [IU]/ML
10 INJECTION, SOLUTION SUBCUTANEOUS AT BEDTIME
Refills: 0 | Status: DISCONTINUED | OUTPATIENT
Start: 2022-08-15 | End: 2022-08-15

## 2022-08-15 RX ORDER — MOXIFLOXACIN HYDROCHLORIDE TABLETS, 400 MG 400 MG/1
1 TABLET, FILM COATED ORAL
Qty: 8 | Refills: 0
Start: 2022-08-15 | End: 2022-08-18

## 2022-08-15 RX ADMIN — HEPARIN SODIUM 5000 UNIT(S): 5000 INJECTION INTRAVENOUS; SUBCUTANEOUS at 13:06

## 2022-08-15 RX ADMIN — Medication 4 UNIT(S): at 13:06

## 2022-08-15 RX ADMIN — Medication 1: at 13:05

## 2022-08-15 RX ADMIN — HEPARIN SODIUM 5000 UNIT(S): 5000 INJECTION INTRAVENOUS; SUBCUTANEOUS at 04:30

## 2022-08-15 NOTE — PROGRESS NOTE ADULT - ASSESSMENT
66 yo M with no known pmhx, does not regualrly see doctors, presents to the ED for generalized weakness/fatigue, decreased PO intake, polydipsia.    # Sepsis resolving present on Admission   # Acute Encephalop[athy from Sepsis   # Bacteremia   Renal sono increased left kidney echogenicity      # UTI#   Hypothermic/ Hyperthermic   Leukocytosis   Positive UA   Blood cx Urine cx - 8/8 Bcx with E. coli (no ESBL gene detected on PCR) and CoNS    COVID neg   Continue with Ceftriaxone   ID consult appreciated         # Hyperglycemia A1C   Start Lantus HISS   Endocrine consult     # PAO dehydration   iv Fluids     # HTN Start patient on low dose Norvasc     D/C planning 45 minutes

## 2022-08-15 NOTE — PROGRESS NOTE ADULT - ASSESSMENT
Patient is a 67 M with no known PMH, does not regularly see doctors here for generalized weakness and fatigue, decreased PO intake, polydipsia admitted for weakness. Patient found to have gram negative rods bacteremia now on Zosyn. Endocrinology consulted for newly diagnosed diabetes. BG values improved on basal/bolus regimen. Tolerating POs. Pt refusing insulin on discharge. Given relatively low requirements and no prior treatment is reasonable to discharge on oral DM meds w/close outpt follow up. DC planning. BG goal (100-180mg/dl).

## 2022-08-15 NOTE — DISCHARGE NOTE NURSING/CASE MANAGEMENT/SOCIAL WORK - PATIENT PORTAL LINK FT
You can access the FollowMyHealth Patient Portal offered by [facility] by registering at the following website: [email or web address]. By joining [employer]’s FollowMyHealth portal, you will also be able to view your health information using other applications (apps) compatible with our system.

## 2022-08-15 NOTE — DISCHARGE NOTE PROVIDER - NSDCCPCAREPLAN_GEN_ALL_CORE_FT
PRINCIPAL DISCHARGE DIAGNOSIS  Diagnosis: Sepsis secondary to UTI  Assessment and Plan of Treatment: --Discharge on Ciprofloxacin 500 mg PO BID with end date: 8/18/22  --s/p Ceftriaxone 1g IV Q24H while admitted      SECONDARY DISCHARGE DIAGNOSES  Diagnosis: Essential hypertension  Assessment and Plan of Treatment: Please start taking Lisinopril 2.5mg daily for your blood pressure and follow up with a PCP.    Diagnosis: Hyperglycemia  Assessment and Plan of Treatment: You do not wish to use insulin on discharge but agreeable to oral DM meds. You mentioned you were shown how to use glucometer and said family is in healthcare field and will help him at home.   Can be discharged on Metformin 500mg BID. If tolerating well after 1 week, can increase to Metformin 1000mg BID.   Also, Start Glipizide ER 2.5mg PO daily  need glucometer/ testing supplies  [facility]   Please follow up with Medicine clinic  to establish PCP care so that your sugar levels are well controlled.    Recommend outpatient endocrinology follow up as patient will need dilated eye exam, follow up with podiatry.

## 2022-08-15 NOTE — DISCHARGE NOTE PROVIDER - HOSPITAL COURSE
66 yo M with no known pmhx, does not regualrly see doctors, presents to the ED for generalized weakness/fatigue, decreased PO intake, polydipsia.      # E. coli bacteremia, Pyelonephritis, Leukocytosis, Fever  UA 37 RBC, 210 WBC, many bacteria, large LE, nitrite +  8/8 CT A/P: bladder wall thickening and edematous L kidney consistent with ascending UTI   8/8 BCx with E. coli  Renal US (8/12) with no evidence of abscess; only with trace perinephric fluid  --Discharge on Ciprofloxacin 500 mg PO BID with end date: 8/18/22  --s/p Ceftriaxone 1g IV Q24H while admitted          # Hyperglycemia A1C >> Problem: Uncontrolled type 2 diabetes mellitus with hyperglycemia.   Endocrine consult -  Pt does not want to use insulin on discharge but agreeable to oral DM meds. Pt said he was shown how to use glucometer and said family is in healthcare field and will help him at home.   -was on Lantus   -was on Admelog 4 units AC meals while inpatient  -cons carb diet  - glucometer use was taught prior to dc home  Dispo:  Can be discharged on Metformin 500mg BID. If tolerating well after 1 week, can increase to Metformin 1000mg BID.   Also, Start Glipizide ER 2.5mg PO daily  need glucometer/ testing supplies  f/u Medicine clinic [address] [place] [address] to establish PCP care   Recommend outpatient endocrinology follow up as patient will need dilated eye exam, follow up with podiatry.      # PAO dehydration   s/p iv Fluids     # HTN Start patient on low dose Norvasc       Patient is medically cleared and stable for discharge. Discussed with .

## 2022-08-15 NOTE — PROGRESS NOTE ADULT - REASON FOR ADMISSION
Weakness x 4 days

## 2022-08-15 NOTE — DISCHARGE NOTE PROVIDER - NSFOLLOWUPCLINICS_GEN_ALL_ED_FT
[facility] General Internal Medicine  General Internal Medicine  [address] [place] [address]  Phone: [phone]  Fax:   Follow Up Time: 1-3 days    [facility] Endocrinology  Endocrinology  [address] [place] [address]  Phone: [phone]  Fax:   Follow Up Time: 1-3 days

## 2022-08-15 NOTE — PROGRESS NOTE ADULT - PROBLEM SELECTOR PLAN 2
BP goal less than 130/80  -consider starting anti-hypertensive      discussed plan w/pt and medicine PA  Can be reached via TEAMS/pager: [phone]   office:  [phone] (M-F 9a-5pm)               [phone] (nights/weekends)   [email or web address] password [email or web address]

## 2022-08-15 NOTE — CHART NOTE - NSCHARTNOTEFT_GEN_A_CORE
MEDICINE NP    IFTIKHAR FORTE  67y Male    Patient is a 67y old  Male who presents with a chief complaint of Weakness x 4 days (08 Aug 2022 19:04)         > Event Summary:   Notified by RN, Patient with critical lab value  -BCX Growth in aerobic bottle: Gram Negative Rods (08.08.22 @ 14:40)  -C/w Zosyn IV  -F/u PCR and Sensitivity Panel   -Rpt BCX x2 ordered for AM  -Will endorse to Day Provider in AM and Attending to follow        -Vital Signs Last 24 Hrs  T(C): 36.8 (09 Aug 2022 04:18), Max: 39.1 (08 Aug 2022 23:49)  T(F): 98.2 (09 Aug 2022 04:18), Max: 102.4 (08 Aug 2022 23:49)  HR: 90 (09 Aug 2022 04:18) (78 - 116)  BP: 175/75 (09 Aug 2022 04:18) (135/81 - 175/75)  BP(mean): 104 (08 Aug 2022 19:19) (104 - 107)  RR: 18 (09 Aug 2022 04:18) (16 - 20)  SpO2: 99% (09 Aug 2022 04:18) (97% - 99%) room air        JEREMY Garza-BC  Medicine Department  #[id number]
Nutrition Follow Up Note  Patient seen for: consult for new DM    Chart reviewed, events noted.  : Patient remains acute. As per medical, team patient  remains on antibiotics secondary to UTI and E coli Bacteremia. Patient had a  fever overnight. Patient is pending renal ultrasound.  DN (no insurance) pyelo- CTZ to , A1C 14- NPH insulin, home         Source: [x] Patient       [x] EMR        [] RN        [] Family at bedside       [] Other:    -If unable to interview patient: [] Trach/Vent/BiPAP  [] Disoriented/confused/inappropriate to interview    Diet Order:   Diet, DASH/TLC:   Sodium & Cholesterol Restricted  Consistent Carbohydrate {Evening Snack} (CSTCHOSN) (22)    - Is current order appropriate/adequate? [x] Yes  []  No:     - PO intake meals :   [x] >75%  Adequate    [] 50-75%  Fair       [] <50%  Poor  - PO intake of supplements if pt receiving: []>75% []50% []25%   as per   []flow sheet  [x]patient  []family/aide  []PCA  []Nurse  []RD observation    - Nutrition-related concerns: pt in need of diabetes diet ed reinforcement since he is new to diabetes. he refused at time of visit. he was requesting salt at time of visit. RD encouraged pt to use Mrs. Dennis    pt seen by SLP []Yes [x]No    GI:  Last BM ___.   Bowel Regimen? [] Yes   [x] No      Weights:   Daily Weight in k.3 (08-10)    Nutritionally Pertinent MEDICATIONS  (STANDING):  cefTRIAXone   IVPB  dextrose 5%.  dextrose 5%.  dextrose 50% Injectable  dextrose 50% Injectable  dextrose 50% Injectable  glucagon  Injectable  insulin glargine Injectable (LANTUS)  insulin lispro (ADMELOG) corrective regimen sliding scale  insulin lispro (ADMELOG) corrective regimen sliding scale  insulin lispro Injectable (ADMELOG)  lactated ringers.    Pertinent Labs: 08-15 @ 07:30: Na 134<L>, BUN 13, Cr 1.09, <H>, K+ 4.2, Phos 3.2, Mg 1.8, Alk Phos 100, ALT/SGPT 36, AST/SGOT 23, HbA1c --    A1C with Estimated Average Glucose Result: 11.4 % (22 @ 06:20)    Finger Sticks:  POCT Blood Glucose.: 174 mg/dL (08-15 @ 12:33)  POCT Blood Glucose.: 89 mg/dL (08-15 @ 08:13)  POCT Blood Glucose.: 198 mg/dL ( @ 21:35)  POCT Blood Glucose.: 135 mg/dL ( @ 17:11)  POCT Blood Glucose.: 99 mg/dL ( @ 13:06)      Pressure Injuries as per nursing documentation:  none  Edema:  none    Estimated Needs:   [x] no change since previous assessment  [] recalculated:     Weight used for calculations	current weight  Estimated Energy Needs Weight (lbs)	179 lb  Estimated Energy Needs Weight (kg)	81.1 kg  Estimated Energy Needs From (shelly/kg)	25  Estimated Energy Needs To (shelly/kg)	29  Estimated Energy Needs Calculated From (shelly/kg)	  Estimated Energy Needs Calculated To (shelly/kg)	2351  Weight used for calculations	current weight  Estimated Protein Needs Weight (lbs)	179 lb  Estimated Protein Needs Weight (kg)	81.1 kg  Estimated Protein Needs From (g/kg)	1  Estimated Protein Needs To (g/kg)	1.1  Estimated Protein Needs Calculated From (g/kg)	81.1  Estimated Protein Needs Calculated To (g/kg)	89.21  Estimated Fluid Needs Weight (lbs)	179 lb  Estimated Fluid Needs Weight (kg)	81.1 kg  Estimated Fluid Needs From (ml/kg)	25  Estimated Fluid Needs To (ml/kg)	30  Estimated Fluid Needs Calculated From (ml/kg)	  Estimated Fluid Needs Calculated To (ml/kg)	2433      Previous Nutrition Diagnosis: Food & Nutrition Related Knowledge Deficit; Unintended Weight Loss  Nutrition Diagnosis is: [x] ongoing  [] resolved [] not applicable     New Nutrition Diagnosis: [x] Not applicable    Nutrition Care Plan:  [] In Progress  [x] Achieved  [] Not applicable    Nutrition Interventions:     Education Provided:       [x] Yes:  [] No:  Low Sodium Diet  pt was educated on the importance of supplements to increase calorie and protein intake in light of RD's nutritional findings []Yes [x]N/A         Recommendations:         [x] Continue current diet order     [] Change diet to:      [] continue oral nutrition supplement:        [] Add micronutrient supplementation:      [] Continue current micronutrient supplementation:        []Discussed recommendations with provider     [] Needed to escalate to provider     []Placed pending verification with NP/PA      []Placed pending verification with Team      []Placed sticker (malnutrition/BMI/underweight)     []Recommend swallow evaluation     [] monitor need for diet ed reinforcement     [] Other:     Monitoring and Evaluation:   Continue to monitor nutritional intake, tolerance to diet prescription, weights, labs, skin integrity      RD remains available upon request and will follow up per protocol  Tiana Santizo MA, RD, CDN #[phone]
Nutrition Follow Up Note  Patient seen for: nutrition consult for education    Chart reviewed, events noted. 68 yo M with no known pmhx, does not regualrly see doctors, presents to the ED for generalized weakness/fatigue, decreased PO intake, polydipsia.    Source: [x] Patient       [x] EMR        [] RN        [] Family at bedside       [] Other:    -If unable to interview patient: [] Trach/Vent/BiPAP  [] Disoriented/confused/inappropriate to interview    Diet Order:   Diet, DASH/TLC:   Sodium & Cholesterol Restricted  Consistent Carbohydrate {Evening Snack} (CSTCHOSN) (22)    - Is current order appropriate/adequate? [x] Yes  []  No:     - PO intake :   [] >75%  Adequate    [x] 50-75%  Fair       [] <50%  Poor   - He reports appetite increased today, PO intake increased today    - Nutrition-related concerns:   - new onset type 2 DM followed by endocrinology. A1c 11.4%. Currently ordered for lantus, and admelog pre-meal   - potassium low today, 3.2    GI:  Last BM 8/10 Bowel Regimen? [] Yes   [x] No      Weights:   Daily Weight in k.3 (08-10)    Nutritionally Pertinent MEDICATIONS  (STANDING):  cefTRIAXone   IVPB  dextrose 5%.  dextrose 5%.  dextrose 50% Injectable  dextrose 50% Injectable  dextrose 50% Injectable  glucagon  Injectable  insulin glargine Injectable (LANTUS)  insulin lispro (ADMELOG) corrective regimen sliding scale  insulin lispro (ADMELOG) corrective regimen sliding scale  insulin lispro Injectable (ADMELOG)  lactated ringers.    Pertinent Labs:  @ 07:21: Na 131<L>, BUN 20, Cr 1.26, <H>, K+ 3.2<L>, Phos --, Mg 2.0, Alk Phos --, ALT/SGPT --, AST/SGOT --, HbA1c --    A1C with Estimated Average Glucose Result: 11.4 % (22 @ 06:20)    Finger Sticks:  POCT Blood Glucose.: 208 mg/dL ( @ 13:29)  POCT Blood Glucose.: 117 mg/dL ( @ 11:34)  POCT Blood Glucose.: 137 mg/dL ( @ 08:14)  POCT Blood Glucose.: 195 mg/dL (08-10 @ 22:19)  POCT Blood Glucose.: 135 mg/dL (08-10 @ 17:20)      Skin per nursing documentation: no pressure injuries   Edema: none    Estimated Needs:   [x] no change since previous assessment  [] recalculated:     Previous Nutrition Diagnosis: Food & Nutrition Related Knowledge Deficit; Unintended Weight Loss  Nutrition Diagnosis is: [x] ongoing  [] resolved [] not applicable     New Nutrition Diagnosis: [x] Not applicable    Nutrition Care Plan:  [x] In Progress  [] Achieved  [] Not applicable    Nutrition Interventions:     Education Provided:       [x] Yes:  Pt received diet education from RD on , written materials provided explaining carb counting. On visit today, pt unable to teach back points of diabetes nutrition education. Reviewed diabetes nutrition education, carb sources, fiber foods, carb portion sizes, checking finger sticks, hypoglycemia protocol. Gave pt the Living with Type 2 Diabetes booklet as well. Pt initially not receptive to discuss education, but was willing to discuss as conversation continued.        Recommendations:         [x] Continue current diet order, montior PO intake     [x] Other: low potassium, notified provider            - provide diet education reinforcement     Monitoring and Evaluation:   Continue to monitor nutritional intake, tolerance to diet prescription, weights, labs, skin integrity    Monique Morris RD, CDN, CDCES. Pager: [phone]   RD remains available upon request and will follow up per protocol
Age: 67y    Gender: Male    POCT Blood Glucose:  99 mg/dL (08-14-22 @ 13:06)  109 mg/dL (08-14-22 @ 08:45)  264 mg/dL (08-13-22 @ 21:25)  136 mg/dL (08-13-22 @ 17:07)      eMAR:  insulin glargine Injectable (LANTUS)   15 Unit(s) SubCutaneous (08-13-22 @ 21:49)    insulin lispro (ADMELOG) corrective regimen sliding scale   1 Unit(s) SubCutaneous (08-13-22 @ 21:48)    insulin lispro Injectable (ADMELOG)   5 Unit(s) SubCutaneous (08-14-22 @ 13:58)   5 Unit(s) SubCutaneous (08-14-22 @ 08:57)   5 Unit(s) SubCutaneous (08-13-22 @ 17:35)     POC glucose, insulin requirements, lab values reviewed.  bg tightly controlled reduce Lantus to 13 units sq qhs   reduce lispro to 4 units to prevent hypoglycemia  c/w LDSS TID AC qHS   see progress note for dispo planning

## 2022-08-15 NOTE — PROGRESS NOTE ADULT - SUBJECTIVE AND OBJECTIVE BOX
Diabetes Follow up note:    Chief complaint: Newly diagnosed DM    Interval Hx: BG values 80-190s over past 24 hours. Pt seen at bedside this AM. Reports feeling well and wants to go home. Pt does not want to use insulin on discharge but agreeable to oral DM meds. Pt said he was shown how to use glucometer and said family is in healthcare field and will help him at home.     Review of Systems:  General: no complaints.   GI: Tolerating POs. Denies N/V/D/Abd pain  CV: Denies CP/SOB  ENDO: No S&Sx of hypoglycemia    MEDS:  insulin glargine Injectable (LANTUS) 13 Unit(s) SubCutaneous at bedtime  insulin lispro (ADMELOG) corrective regimen sliding scale   SubCutaneous three times a day before meals  insulin lispro (ADMELOG) corrective regimen sliding scale   SubCutaneous at bedtime  insulin lispro Injectable (ADMELOG) 4 Unit(s) SubCutaneous three times a day before meals    cefTRIAXone   IVPB 1000 milliGRAM(s) IV Intermittent every 24 hours    Allergies    No Known Allergies      PE:  General: Male lying in bed. NAD>   Vital Signs Last 24 Hrs  T(C): 37 (15 Aug 2022 08:35), Max: 37.5 (14 Aug 2022 13:35)  T(F): 98.6 (15 Aug 2022 08:35), Max: 99.5 (14 Aug 2022 13:35)  HR: 63 (15 Aug 2022 08:35) (63 - 70)  BP: 140/59 (15 Aug 2022 08:35) (140/59 - 152/72)  BP(mean): --  RR: 18 (15 Aug 2022 08:35) (18 - 18)  SpO2: 100% (15 Aug 2022 08:35) (96% - 100%)    Parameters below as of 15 Aug 2022 08:35  Patient On (Oxygen Delivery Method): room air      Abd: Soft, NT,ND,   Extremities: Warm  Neuro: A&O X3    LABS:  POCT Blood Glucose.: 89 mg/dL (08-15-22 @ 08:13)  POCT Blood Glucose.: 198 mg/dL (08-14-22 @ 21:35)  POCT Blood Glucose.: 135 mg/dL (08-14-22 @ 17:11)  POCT Blood Glucose.: 99 mg/dL (08-14-22 @ 13:06)  POCT Blood Glucose.: 109 mg/dL (08-14-22 @ 08:45)  POCT Blood Glucose.: 264 mg/dL (08-13-22 @ 21:25)  POCT Blood Glucose.: 136 mg/dL (08-13-22 @ 17:07)  POCT Blood Glucose.: 170 mg/dL (08-13-22 @ 12:49)  POCT Blood Glucose.: 127 mg/dL (08-13-22 @ 08:47)  POCT Blood Glucose.: 142 mg/dL (08-12-22 @ 22:07)  POCT Blood Glucose.: 152 mg/dL (08-12-22 @ 17:09)  POCT Blood Glucose.: 108 mg/dL (08-12-22 @ 12:26)                            9.5    6.05  )-----------( 648      ( 15 Aug 2022 07:29 )             30.1       08-15    134<L>  |  99  |  13  ----------------------------<  117<H>  4.2   |  25  |  1.09    eGFR: 74    Ca    9.0      08-15  Mg     1.8     08-15  Phos  3.2     08-15    TPro  6.7  /  Alb  2.7<L>  /  TBili  0.3  /  DBili  x   /  AST  23  /  ALT  36  /  AlkPhos  100  08-15      Thyroid Function Tests:  08-08 @ 15:21 TSH 0.81 FreeT4 -- T3 -- Anti TPO -- Anti Thyroglobulin Ab -- TSI --      A1C with Estimated Average Glucose Result: 11.4 % (08-09-22 @ 06:20)          Contact number: marcus [phone] or [phone]

## 2022-08-15 NOTE — DISCHARGE NOTE NURSING/CASE MANAGEMENT/SOCIAL WORK - NSDCPEFALRISK_GEN_ALL_CORE
For information on Fall & Injury Prevention, visit: [email or web address] OR  [email or web address] OR  https://www.cdc.gov/steadi/patient.html

## 2022-08-15 NOTE — PROGRESS NOTE ADULT - NUTRITIONAL ASSESSMENT
Diet, DASH/TLC:   Sodium & Cholesterol Restricted  Consistent Carbohydrate {Evening Snack} (CSTCHOSN) (08-08-22 @ 20:07) [Active]

## 2022-08-15 NOTE — DISCHARGE NOTE PROVIDER - NSDCMRMEDTOKEN_GEN_ALL_CORE_FT
alcohol swabs : Apply topically to affected area 4 times a day   glucometer (per patient&#x27;s insurance): Test blood sugars four times a day. Dispense #1 glucometer.  lancets: 1 application subcutaneously 4 times a day   test strips (per patient&#x27;s insurance): 1 application subcutaneously 4 times a day. ** Compatible with patient&#x27;s glucometer **

## 2022-08-15 NOTE — PROGRESS NOTE ADULT - PROBLEM SELECTOR PLAN 1
-test BG AC/HS  -Decrease Lantus 10 units QHS if pt remains inpatient tonight  -c/w Admelog 4 units AC meals while inpatient  -c/w Admelog low correction scale AC and low HS scale  -cons carb diet  -Please teach pt how to use glucometer prior to dc home  Dispo:  Can be discharged on Metformin 500mg BID. If tolerating well after 1 week, can increase to Metformin 1000mg BID.   Also, Start Glipizide ER 2.5mg PO daily  need glucometer/ testing supplies  f/u Medicine clinic [address] [place] [address] to establish PCP care   Recommend outpatient endocrinology follow up as patient will need dilated eye exam, follow up with podiatry

## 2022-08-15 NOTE — PROGRESS NOTE ADULT - PROVIDER SPECIALTY LIST ADULT
Hospitalist
Infectious Disease
Infectious Disease
Endocrinology
Endocrinology
Hospitalist
Hospitalist
Infectious Disease
Endocrinology
Hospitalist
Endocrinology
Endocrinology

## 2022-08-15 NOTE — PROGRESS NOTE ADULT - SUBJECTIVE AND OBJECTIVE BOX
Patient is a 67y old  Male who presents with a chief complaint of Weakness x 4 days (09 Aug 2022 14:38)      SUBJECTIVE / OVERNIGHT EVENTS: patient afebrile       MEDICATIONS  (STANDING):    MEDICATIONS  (PRN):          PHYSICAL EXAM:  GENERAL: NAD, well-developed  HEAD:  Atraumatic, Normocephalic  EYES: EOMI, conjunctiva and sclera clear  NECK: Supple, No JVD  CHEST/LUNG: Clear to auscultation bilaterally; No wheeze  HEART: Regular rate and rhythm; No murmurs, rubs, or gallops  ABDOMEN: Soft, Nontender, Nondistended; Bowel sounds present  EXTREMITIES:  2+ Peripheral Pulses, No clubbing, cyanosis, or edema  PSYCH: AAOx3  NEUROLOGY: non-focal  SKIN: No rashes or lesions                                                         9.5    6.05  )-----------( 648      ( 15 Aug 2022 07:29 )             30.1           LIVER FUNCTIONS - ( 15 Aug 2022 07:30 )  Alb: 2.7 g/dL / Pro: 6.7 g/dL / ALK PHOS: 100 U/L / ALT: 36 U/L / AST: 23 U/L / GGT: x               133|100|16<111  3.9|25|1.21  8.7,--,--  08-14 @ 07:30  Consultant(s) Notes Reviewed:      Care Discussed with Consultants/Other Providers:

## 2022-09-01 PROBLEM — Z78.9 OTHER SPECIFIED HEALTH STATUS: Chronic | Status: ACTIVE | Noted: 2022-08-08

## 2022-09-20 DIAGNOSIS — Z00.00 ENCOUNTER FOR GENERAL ADULT MEDICAL EXAMINATION W/OUT ABNORMAL FINDINGS: ICD-10-CM

## 2022-09-21 ENCOUNTER — APPOINTMENT (OUTPATIENT)
Dept: ENDOCRINOLOGY | Facility: CLINIC | Age: 67
End: 2022-09-21

## 2022-11-28 ENCOUNTER — APPOINTMENT (OUTPATIENT)
Dept: ENDOCRINOLOGY | Facility: CLINIC | Age: 67
End: 2022-11-28

## 2023-01-03 NOTE — CONSULT NOTE ADULT - CONSULT REQUESTED BY NAME
After obtaining consent, and per orders of Princess GRACE, injection of Toradol 60mg administered to L dorso gluteal IM and Decadron 8mg administered to R dorso gluteal IM. Given by Lor Nunn MA. Patient instructed to remain in clinic for 20 minutes afterwards, and to report any adverse reaction to me immediately. Pt tolerated well with no adverse reactions.     
Kiara
Elaine Craig

## 2024-12-16 NOTE — PROGRESS NOTE ADULT - ASSESSMENT
Patient is a 67 M with no known PMH, does not regularly see doctors here for generalized weakness and fatigue, decreased PO intake, polydipsia admitted for weakness. Patient found to have gram negative rods bacteremia now on Zosyn. Endocrinology consulted for newly diagnosed diabetes.     1.  Newly diagnosed DM with Symptomatic hyperglycemia  - Likely has T2D based on body habitus   - Most recent Hemoglobin A1C 11.4  - Current FS ranges from  130-200. Fasting sugar 167 at gaol.    - Current inpatient regimen: lantus 15, lispro 5 LDSS  - Current diet: CHO/DASH diet   - Please monitor blood glucose values TID AC & QHS while eating regular meals and Q6H while NPO  - Blood glucose goals pre-meal less than 140 mg/dL and random blood glucose less than 180 mg/dL  - Recommendations:   - Fasting glucose at goal, continue with insulin Glargine 15 units QHS  - Limited prandial glucose data, would continue insulin Lispro 5 units TID with meals, hold if NPO or if eating less than 50% of meals  - Continue with low dose correctional scale TID with meals  - Continue with low dose Correctional scale QHS    2. HTN  - BP goal 130/80  - Current -170/70-80  - Manage per primary team     3. HLD  - Lipid profile pending   - LDL goal <70  - Patient will likely benefit from statin therapy   - Manage as outpatient      Thank you for the consult. Will continue to monitor. Discussed with the primary team.     Discharge planning:  - Likely Basal/bolus and metformin depending on insulin requirements   - Recommend outpatient endocrinology follow up as patient will need dilated eye exam, follow up with podiatry  - Nutrition consult   - Patient will need insulin pen teaching, glucose testing teaching as well   - Please teach and allow patient to do own finger sticks and insulin injections under RN supervision  - Please teach use of insulin pen  - Please document teach back  - Patient will also need all diabetes supplies including, lancets, glucometer, testing strips, insulin pens, needles at time of discharge     Fabby Hayden MD  Department of Endocrinology, diabetes and metabolism   Pager [phone] Patient is a 67 M with no known PMH, does not regularly see doctors here for generalized weakness and fatigue, decreased PO intake, polydipsia admitted for weakness. Patient found to have gram negative rods bacteremia now on Zosyn. Endocrinology consulted for newly diagnosed diabetes.     1.  Newly diagnosed DM with Symptomatic hyperglycemia  - Likely has T2D based on body habitus   - Most recent Hemoglobin A1C 11.4  - Current FS ranges from  130-200. Fasting sugar 167 at goal.    - Current inpatient regimen: lantus 15, lispro 5 LDSS  - Current diet: CHO/DASH diet   - Please monitor blood glucose values TID AC & QHS while eating regular meals and Q6H while NPO  - Blood glucose goals pre-meal less than 140 mg/dL and random blood glucose less than 180 mg/dL  - Recommendations:   - Fasting glucose at goal, continue with insulin Glargine 15 units QHS  - Limited prandial glucose data, would continue insulin Lispro 5 units TID with meals, hold if NPO or if eating less than 50% of meals  - Continue with low dose correctional scale TID with meals  - Continue with low dose Correctional scale QHS    2. HTN  - BP goal 130/80  - Current -170/70-80  - Manage per primary team     3. 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, can repeat as outpatient and may warrant further genetic testing      Thank you for the consult. Will continue to monitor. Discussed with the primary team.     Discharge planning:  - Likely Basal/bolus and metformin depending on insulin requirements   - Recommend outpatient endocrinology follow up as patient will need dilated eye exam, follow up with podiatry  - Nutrition consult   - Patient will need insulin pen teaching, glucose testing teaching as well   - Please teach and allow patient to do own finger sticks and insulin injections under RN supervision  - Please teach use of insulin pen  - Please document teach back  - Patient will also need all diabetes supplies including, lancets, glucometer, testing strips, insulin pens, needles at time of discharge     Fabby Hayden MD  Department of Endocrinology, diabetes and metabolism   Pager [phone] 16-Dec-2024 08:26